# Patient Record
Sex: FEMALE | Race: WHITE | HISPANIC OR LATINO | Employment: FULL TIME | URBAN - METROPOLITAN AREA
[De-identification: names, ages, dates, MRNs, and addresses within clinical notes are randomized per-mention and may not be internally consistent; named-entity substitution may affect disease eponyms.]

---

## 2023-02-17 PROBLEM — G89.18 POST-OPERATIVE PAIN: Status: ACTIVE | Noted: 2023-02-17

## 2023-02-17 PROBLEM — J30.89 NON-SEASONAL ALLERGIC RHINITIS: Status: ACTIVE | Noted: 2023-02-17

## 2023-02-17 PROBLEM — H10.12 ALLERGIC CONJUNCTIVITIS OF LEFT EYE: Status: ACTIVE | Noted: 2023-02-17

## 2023-02-17 PROBLEM — Z98.61 CAD S/P PERCUTANEOUS CORONARY ANGIOPLASTY: Status: ACTIVE | Noted: 2023-02-17

## 2023-02-17 PROBLEM — E78.1 HYPERTRIGLYCERIDEMIA: Status: ACTIVE | Noted: 2023-02-17

## 2023-02-17 PROBLEM — R09.89 BRUIT OF LEFT CAROTID ARTERY: Status: ACTIVE | Noted: 2023-02-17

## 2023-02-17 PROBLEM — R06.83 SNORING: Status: ACTIVE | Noted: 2023-02-17

## 2023-02-17 PROBLEM — E66.3 OVERWEIGHT: Status: ACTIVE | Noted: 2023-02-17

## 2023-02-17 PROBLEM — E66.811 CLASS 1 OBESITY WITH BODY MASS INDEX (BMI) OF 34.0 TO 34.9 IN ADULT: Status: ACTIVE | Noted: 2023-02-17

## 2023-02-17 PROBLEM — M25.511 SHOULDER PAIN, RIGHT: Status: ACTIVE | Noted: 2023-02-17

## 2023-02-17 PROBLEM — M16.11 OSTEOARTHRITIS OF RIGHT HIP: Status: ACTIVE | Noted: 2023-02-17

## 2023-02-17 PROBLEM — F06.4 ANXIETY DISORDER DUE TO MEDICAL CONDITION: Status: ACTIVE | Noted: 2023-02-17

## 2023-02-17 PROBLEM — R07.9 CHEST PAIN: Status: ACTIVE | Noted: 2023-02-17

## 2023-02-17 PROBLEM — E78.5 DYSLIPIDEMIA: Status: ACTIVE | Noted: 2023-02-17

## 2023-02-17 PROBLEM — R00.2 PALPITATIONS: Status: ACTIVE | Noted: 2023-02-17

## 2023-02-17 PROBLEM — N39.0 UTI (URINARY TRACT INFECTION): Status: ACTIVE | Noted: 2023-02-17

## 2023-02-17 PROBLEM — J45.909 ASTHMA (HHS-HCC): Status: ACTIVE | Noted: 2023-02-17

## 2023-02-17 PROBLEM — E03.9 HYPOTHYROIDISM: Status: ACTIVE | Noted: 2023-02-17

## 2023-02-17 PROBLEM — R60.0 LOCALIZED EDEMA: Status: ACTIVE | Noted: 2023-02-17

## 2023-02-17 PROBLEM — I25.2 H/O NON-ST ELEVATION MYOCARDIAL INFARCTION (NSTEMI): Status: ACTIVE | Noted: 2023-02-17

## 2023-02-17 PROBLEM — R92.8 ABNORMAL MAMMOGRAM: Status: ACTIVE | Noted: 2023-02-17

## 2023-02-17 PROBLEM — I25.10 CAD S/P PERCUTANEOUS CORONARY ANGIOPLASTY: Status: ACTIVE | Noted: 2023-02-17

## 2023-02-17 PROBLEM — S46.911A STRAIN OF RIGHT SHOULDER: Status: ACTIVE | Noted: 2023-02-17

## 2023-02-17 PROBLEM — K59.04 CHRONIC IDIOPATHIC CONSTIPATION: Status: ACTIVE | Noted: 2023-02-17

## 2023-02-17 PROBLEM — J32.9 SINUS INFECTION: Status: ACTIVE | Noted: 2023-02-17

## 2023-02-17 PROBLEM — R00.1 SINUS BRADYCARDIA: Status: ACTIVE | Noted: 2023-02-17

## 2023-02-17 PROBLEM — R06.02 SHORTNESS OF BREATH: Status: ACTIVE | Noted: 2023-02-17

## 2023-02-17 PROBLEM — Z96.649 STATUS POST TOTAL REPLACEMENT OF HIP: Status: ACTIVE | Noted: 2023-02-17

## 2023-02-17 PROBLEM — E66.9 CLASS 1 OBESITY WITH BODY MASS INDEX (BMI) OF 34.0 TO 34.9 IN ADULT: Status: ACTIVE | Noted: 2023-02-17

## 2023-02-17 PROBLEM — F41.0 PANIC ATTACK: Status: ACTIVE | Noted: 2023-02-17

## 2023-02-17 PROBLEM — M25.9 HIP PROBLEM: Status: ACTIVE | Noted: 2023-02-17

## 2023-02-17 RX ORDER — ALBUTEROL SULFATE 90 UG/1
1-2 AEROSOL, METERED RESPIRATORY (INHALATION) EVERY 4 HOURS PRN
COMMUNITY
End: 2023-08-08 | Stop reason: SDUPTHER

## 2023-02-17 RX ORDER — ACETAMINOPHEN 500 MG
1 TABLET ORAL EVERY 6 HOURS PRN
COMMUNITY
Start: 2021-02-15

## 2023-02-17 RX ORDER — TRIAMCINOLONE ACETONIDE 55 UG/1
1 SPRAY, METERED NASAL
COMMUNITY
Start: 2020-02-28

## 2023-02-17 RX ORDER — BUSPIRONE HYDROCHLORIDE 5 MG/1
1 TABLET ORAL DAILY
COMMUNITY

## 2023-02-17 RX ORDER — CYCLOBENZAPRINE HCL 10 MG
1 TABLET ORAL NIGHTLY
COMMUNITY
Start: 2021-12-08

## 2023-02-17 RX ORDER — EPINEPHRINE 0.22MG
1 AEROSOL WITH ADAPTER (ML) INHALATION DAILY
COMMUNITY

## 2023-02-17 RX ORDER — ISOSORBIDE MONONITRATE 30 MG/1
1 TABLET, EXTENDED RELEASE ORAL DAILY
COMMUNITY

## 2023-02-17 RX ORDER — LEVOTHYROXINE SODIUM 75 UG/1
1 TABLET ORAL DAILY
COMMUNITY
Start: 2020-01-14 | End: 2023-04-06

## 2023-02-17 RX ORDER — NAPROXEN SODIUM 220 MG/1
1 TABLET, FILM COATED ORAL
COMMUNITY

## 2023-02-17 RX ORDER — DOCUSATE SODIUM 100 MG/1
1 CAPSULE, LIQUID FILLED ORAL DAILY
COMMUNITY
Start: 2021-11-05

## 2023-02-17 RX ORDER — ATORVASTATIN CALCIUM 20 MG/1
0.5 TABLET, FILM COATED ORAL NIGHTLY
COMMUNITY
Start: 2021-08-05 | End: 2023-11-10

## 2023-02-17 RX ORDER — MONTELUKAST SODIUM 10 MG/1
1 TABLET ORAL NIGHTLY
COMMUNITY
Start: 2020-01-13 | End: 2023-04-03

## 2023-02-17 RX ORDER — NITROGLYCERIN 0.4 MG/1
1 TABLET SUBLINGUAL EVERY 5 MIN PRN
COMMUNITY
Start: 2019-01-08 | End: 2023-12-11 | Stop reason: SDUPTHER

## 2023-02-17 RX ORDER — POLYETHYLENE GLYCOL 3350 17 G/17G
17 POWDER, FOR SOLUTION ORAL DAILY
COMMUNITY
Start: 2019-04-29

## 2023-03-31 ENCOUNTER — APPOINTMENT (OUTPATIENT)
Dept: PRIMARY CARE | Facility: CLINIC | Age: 58
End: 2023-03-31
Payer: COMMERCIAL

## 2023-04-03 DIAGNOSIS — J45.909 MILD ASTHMA WITHOUT COMPLICATION, UNSPECIFIED WHETHER PERSISTENT (HHS-HCC): ICD-10-CM

## 2023-04-03 RX ORDER — MONTELUKAST SODIUM 10 MG/1
TABLET ORAL
Qty: 90 TABLET | Refills: 3 | Status: SHIPPED | OUTPATIENT
Start: 2023-04-03

## 2023-04-04 DIAGNOSIS — E03.9 HYPOTHYROIDISM, UNSPECIFIED TYPE: ICD-10-CM

## 2023-04-06 DIAGNOSIS — E03.9 HYPOTHYROIDISM, UNSPECIFIED TYPE: Primary | ICD-10-CM

## 2023-04-06 RX ORDER — LEVOTHYROXINE SODIUM 75 UG/1
TABLET ORAL
Qty: 90 TABLET | Refills: 3 | Status: SHIPPED | OUTPATIENT
Start: 2023-04-06 | End: 2023-08-08 | Stop reason: SDUPTHER

## 2023-06-06 ENCOUNTER — OFFICE VISIT (OUTPATIENT)
Dept: PRIMARY CARE | Facility: CLINIC | Age: 58
End: 2023-06-06
Payer: COMMERCIAL

## 2023-06-06 VITALS
BODY MASS INDEX: 33.91 KG/M2 | SYSTOLIC BLOOD PRESSURE: 134 MMHG | WEIGHT: 167.9 LBS | HEART RATE: 70 BPM | DIASTOLIC BLOOD PRESSURE: 77 MMHG | OXYGEN SATURATION: 95 %

## 2023-06-06 DIAGNOSIS — E03.9 HYPOTHYROIDISM, UNSPECIFIED TYPE: ICD-10-CM

## 2023-06-06 DIAGNOSIS — I25.10 CAD S/P PERCUTANEOUS CORONARY ANGIOPLASTY: ICD-10-CM

## 2023-06-06 DIAGNOSIS — J20.9 ACUTE BRONCHITIS, UNSPECIFIED ORGANISM: ICD-10-CM

## 2023-06-06 DIAGNOSIS — J01.90 ACUTE SINUSITIS, RECURRENCE NOT SPECIFIED, UNSPECIFIED LOCATION: Primary | ICD-10-CM

## 2023-06-06 DIAGNOSIS — E55.9 VITAMIN D DEFICIENCY: ICD-10-CM

## 2023-06-06 DIAGNOSIS — Z79.899 DRUG THERAPY: ICD-10-CM

## 2023-06-06 DIAGNOSIS — E78.5 HYPERLIPIDEMIA, UNSPECIFIED HYPERLIPIDEMIA TYPE: ICD-10-CM

## 2023-06-06 DIAGNOSIS — J45.20 MILD INTERMITTENT ASTHMA, UNSPECIFIED WHETHER COMPLICATED (HHS-HCC): ICD-10-CM

## 2023-06-06 DIAGNOSIS — Z98.61 CAD S/P PERCUTANEOUS CORONARY ANGIOPLASTY: ICD-10-CM

## 2023-06-06 DIAGNOSIS — Z13.9 SCREENING FOR CONDITION: ICD-10-CM

## 2023-06-06 LAB
THYROTROPIN (MIU/L) IN SER/PLAS BY DETECTION LIMIT <= 0.05 MIU/L: 5.73 MIU/L (ref 0.44–3.98)
THYROXINE (T4) FREE (NG/DL) IN SER/PLAS: 0.75 NG/DL (ref 0.61–1.12)
TRIIODOTHYRONINE (T3) FREE (PG/ML) IN SER/PLAS: 3.1 PG/ML (ref 2.3–4.2)

## 2023-06-06 PROCEDURE — 84439 ASSAY OF FREE THYROXINE: CPT

## 2023-06-06 PROCEDURE — 99215 OFFICE O/P EST HI 40 MIN: CPT | Performed by: FAMILY MEDICINE

## 2023-06-06 PROCEDURE — 3008F BODY MASS INDEX DOCD: CPT | Performed by: FAMILY MEDICINE

## 2023-06-06 PROCEDURE — 84443 ASSAY THYROID STIM HORMONE: CPT

## 2023-06-06 PROCEDURE — 84481 FREE ASSAY (FT-3): CPT

## 2023-06-06 PROCEDURE — 99417 PROLNG OP E/M EACH 15 MIN: CPT | Performed by: FAMILY MEDICINE

## 2023-06-06 RX ORDER — DOXYCYCLINE 100 MG/1
CAPSULE ORAL
Qty: 20 CAPSULE | Refills: 0 | Status: SHIPPED | OUTPATIENT
Start: 2023-06-06 | End: 2023-10-31 | Stop reason: ALTCHOICE

## 2023-06-06 NOTE — PATIENT INSTRUCTIONS
We will do annual preventative visit with annual lab review next time.    Obesity, BMI 33, may have lost 6 pounds pounds the last year or so. -->>  Try to limit intake of simple carbohydrates, things like bread, pasta, potatoes, rice, sugars etc.    Regarding previous labs:  -Hyperlipidemia: HDL 57, goal is 45 or more.  LDL is 29, goal is 70 or less.  Total to good ratio is 2.0, goal is less than 2.8.  So cholesterol numbers are great on atorvastatin 20 mg. -->>  We will recheck with next annual labs.  -Hypothyroidism, on levothyroxine 75 mcg daily.  Last TSH in January was 3.16.  Patient notes she has been having cold intolerance and gaining weight for maybe a year or so. -->> will draw thyroid panel today.  If labs are consistent with last time, will and increase levothyroxine to 100 mcg when the labs get back.  Also discussed other options.  -Vitamin D deficiency.  Patient's not on vitamin D at this time.  No value found on the chart. -->>  We will check with labs in a couple months.  In the meantime I recommend you  5000 units vitamin D3, take 1 daily.  -History of anemia post hip surgery.  Otherwise hemoglobin was within normal limits. -->>  Will be checking annual labs before next appointment.    asthma.  Needing albuterol maybe twice a week.    -We will draw thyroid panel today.  Call us in 2 days if you do not hear any results.  - Return in about 2 months for annual lab review and preventative visit.  - We will give patient lab slip to get fasting annual labs including a thyroid panel at the [facility] about a week before next appointment.

## 2023-06-06 NOTE — PROGRESS NOTES
Subjective   Patient ID: Scotty Vazquez is a 58 y.o. female who presents for NEW PATIENT SICK VISIT (Possible sinus x1week. Congestion, headache and tightness in chest.).  HPI  Acute sinusitis, with acute bronchitis.  Started about a week ago.  Has been using Mucinex, Nasacort, Zyrtec. -->>  We will prescribe 10 days of doxycycline.  Continue the Mucinex, you might want to get the 12-hour version, you could take 1200 mg every 12 hours with a big glass of water and plenty of water throughout the day to help the phlegm cough up more easily.  Continue your Nasacort.  You mind considering skipping Zyrtec sometimes.  Because it can make your phlegm more thick which is kind of the opposite of what the Mucinex.    Review of Systems  Denies N/V/D/C, denies rashes/lesions, no CP/SOB. Denies fevers, for chills.  Positive for dizziness and frontal headache.  All other systems were negative.    Objective   Physical Exam  Gen: NAD  eyes: EOMI, PERRLA  ENT: hearing grossly intact, no nasal discharge  resp: CTABL, without R/R  heart: RRR without MRG  GI: abd: S/ND/NT, BS+  lymph: no axillary, cervical, supraclavicular lymphadenopathy noted   MS: gait grossly WNL,  derm: no rashes or lesions noted  neuro: CN II-XII grossly intact  psych: A&Ox3    Assessment/Plan   Diagnoses and all orders for this visit:  Acute sinusitis, recurrence not specified, unspecified location  -     doxycycline (Monodox) 100 mg capsule; 1 by mouth twice daily with food for 10 days.  Acute bronchitis, unspecified organism  -     doxycycline (Monodox) 100 mg capsule; 1 by mouth twice daily with food for 10 days.  Hypothyroidism, unspecified type  Mild intermittent asthma, unspecified whether complicated  CAD S/P percutaneous coronary angioplasty  BMI 33.0-33.9,adult  Hyperlipidemia, unspecified hyperlipidemia type           We will do annual preventative visit with annual lab review next time.    Obesity, BMI 33, may have lost 6 pounds pounds the last year  or so. -->>  Try to limit intake of simple carbohydrates, things like bread, pasta, potatoes, rice, sugars etc.    Regarding previous labs:  -Hyperlipidemia: HDL 57, goal is 45 or more.  LDL is 29, goal is 70 or less.  Total to good ratio is 2.0, goal is less than 2.8.  So cholesterol numbers are great on atorvastatin 20 mg. -->>  We will recheck with next annual labs.  -Hypothyroidism, on levothyroxine 75 mcg daily.  Last TSH in January was 3.16.  Patient notes she has been having cold intolerance and gaining weight for maybe a year or so. -->> will draw thyroid panel today.  If labs are consistent with last time, will and increase levothyroxine to 100 mcg when the labs get back.  Also discussed other options.  -Vitamin D deficiency.  Patient's not on vitamin D at this time.  No value found on the chart. -->>  We will check with labs in a couple months.  In the meantime I recommend you  5000 units vitamin D3, take 1 daily.  -History of anemia post hip surgery.  Otherwise hemoglobin was within normal limits. -->>  Will be checking annual labs before next appointment.    asthma.  Needing albuterol maybe twice a week.    -We will draw thyroid panel today.  Call us in 2 days if you do not hear any results.  - Return in about 2 months for annual lab review and preventative visit.  - We will give patient lab slip to get fasting annual labs including a thyroid panel at the [facility] about a week before next appointment.

## 2023-06-19 ENCOUNTER — APPOINTMENT (OUTPATIENT)
Dept: PRIMARY CARE | Facility: CLINIC | Age: 58
End: 2023-06-19
Payer: COMMERCIAL

## 2023-08-01 ENCOUNTER — LAB (OUTPATIENT)
Dept: LAB | Facility: LAB | Age: 58
End: 2023-08-01
Payer: COMMERCIAL

## 2023-08-01 DIAGNOSIS — E55.9 VITAMIN D DEFICIENCY: ICD-10-CM

## 2023-08-01 DIAGNOSIS — E03.9 HYPOTHYROIDISM, UNSPECIFIED TYPE: ICD-10-CM

## 2023-08-01 DIAGNOSIS — Z13.9 SCREENING FOR CONDITION: ICD-10-CM

## 2023-08-01 DIAGNOSIS — Z79.899 DRUG THERAPY: ICD-10-CM

## 2023-08-01 LAB
ALANINE AMINOTRANSFERASE (SGPT) (U/L) IN SER/PLAS: 22 U/L (ref 7–45)
ALBUMIN (G/DL) IN SER/PLAS: 4.3 G/DL (ref 3.4–5)
ALKALINE PHOSPHATASE (U/L) IN SER/PLAS: 77 U/L (ref 33–110)
ANION GAP IN SER/PLAS: 11 MMOL/L (ref 10–20)
APPEARANCE, URINE: ABNORMAL
ASPARTATE AMINOTRANSFERASE (SGOT) (U/L) IN SER/PLAS: 18 U/L (ref 9–39)
BACTERIA, URINE: ABNORMAL /HPF
BASOPHILS (10*3/UL) IN BLOOD BY AUTOMATED COUNT: 0.02 X10E9/L (ref 0–0.1)
BASOPHILS/100 LEUKOCYTES IN BLOOD BY AUTOMATED COUNT: 0.3 % (ref 0–2)
BILIRUBIN TOTAL (MG/DL) IN SER/PLAS: 0.6 MG/DL (ref 0–1.2)
BILIRUBIN, URINE: NEGATIVE
BLOOD, URINE: NEGATIVE
CALCIUM (MG/DL) IN SER/PLAS: 9.2 MG/DL (ref 8.6–10.3)
CARBON DIOXIDE, TOTAL (MMOL/L) IN SER/PLAS: 28 MMOL/L (ref 21–32)
CHLORIDE (MMOL/L) IN SER/PLAS: 107 MMOL/L (ref 98–107)
CHOLESTEROL (MG/DL) IN SER/PLAS: 123 MG/DL (ref 0–199)
CHOLESTEROL IN HDL (MG/DL) IN SER/PLAS: 50.7 MG/DL
CHOLESTEROL/HDL RATIO: 2.4
COLOR, URINE: YELLOW
CREATININE (MG/DL) IN SER/PLAS: 0.63 MG/DL (ref 0.5–1.05)
EOSINOPHILS (10*3/UL) IN BLOOD BY AUTOMATED COUNT: 0.2 X10E9/L (ref 0–0.7)
EOSINOPHILS/100 LEUKOCYTES IN BLOOD BY AUTOMATED COUNT: 3.1 % (ref 0–6)
ERYTHROCYTE DISTRIBUTION WIDTH (RATIO) BY AUTOMATED COUNT: 13.4 % (ref 11.5–14.5)
ERYTHROCYTE MEAN CORPUSCULAR HEMOGLOBIN CONCENTRATION (G/DL) BY AUTOMATED: 33.6 G/DL (ref 32–36)
ERYTHROCYTE MEAN CORPUSCULAR VOLUME (FL) BY AUTOMATED COUNT: 91 FL (ref 80–100)
ERYTHROCYTES (10*6/UL) IN BLOOD BY AUTOMATED COUNT: 4.59 X10E12/L (ref 4–5.2)
ESTIMATED AVERAGE GLUCOSE FOR HBA1C: 108 MG/DL
GFR FEMALE: >90 ML/MIN/1.73M2
GLUCOSE (MG/DL) IN SER/PLAS: 95 MG/DL (ref 74–99)
GLUCOSE, URINE: NEGATIVE MG/DL
HEMATOCRIT (%) IN BLOOD BY AUTOMATED COUNT: 41.7 % (ref 36–46)
HEMOGLOBIN (G/DL) IN BLOOD: 14 G/DL (ref 12–16)
HEMOGLOBIN A1C/HEMOGLOBIN TOTAL IN BLOOD: 5.4 %
IMMATURE GRANULOCYTES/100 LEUKOCYTES IN BLOOD BY AUTOMATED COUNT: 0.3 % (ref 0–0.9)
KETONES, URINE: NEGATIVE MG/DL
LDL: 48 MG/DL (ref 0–99)
LEUKOCYTE ESTERASE, URINE: ABNORMAL
LEUKOCYTES (10*3/UL) IN BLOOD BY AUTOMATED COUNT: 6.5 X10E9/L (ref 4.4–11.3)
LYMPHOCYTES (10*3/UL) IN BLOOD BY AUTOMATED COUNT: 2.31 X10E9/L (ref 1.2–4.8)
LYMPHOCYTES/100 LEUKOCYTES IN BLOOD BY AUTOMATED COUNT: 35.4 % (ref 13–44)
MAGNESIUM (MG/DL) IN SER/PLAS: 2.18 MG/DL (ref 1.6–2.4)
MONOCYTES (10*3/UL) IN BLOOD BY AUTOMATED COUNT: 0.39 X10E9/L (ref 0.1–1)
MONOCYTES/100 LEUKOCYTES IN BLOOD BY AUTOMATED COUNT: 6 % (ref 2–10)
MUCUS, URINE: ABNORMAL /LPF
NEUTROPHILS (10*3/UL) IN BLOOD BY AUTOMATED COUNT: 3.58 X10E9/L (ref 1.2–7.7)
NEUTROPHILS/100 LEUKOCYTES IN BLOOD BY AUTOMATED COUNT: 54.9 % (ref 40–80)
NITRITE, URINE: NEGATIVE
PH, URINE: 5 (ref 5–8)
PLATELETS (10*3/UL) IN BLOOD AUTOMATED COUNT: 234 X10E9/L (ref 150–450)
POTASSIUM (MMOL/L) IN SER/PLAS: 4.3 MMOL/L (ref 3.5–5.3)
PROTEIN TOTAL: 6.7 G/DL (ref 6.4–8.2)
PROTEIN, URINE: NEGATIVE MG/DL
RBC, URINE: 1 /HPF (ref 0–5)
SODIUM (MMOL/L) IN SER/PLAS: 142 MMOL/L (ref 136–145)
SPECIFIC GRAVITY, URINE: 1.02 (ref 1–1.03)
SQUAMOUS EPITHELIAL CELLS, URINE: 19 /HPF
THYROTROPIN (MIU/L) IN SER/PLAS BY DETECTION LIMIT <= 0.05 MIU/L: 7.02 MIU/L (ref 0.44–3.98)
THYROXINE (T4) FREE (NG/DL) IN SER/PLAS: 0.75 NG/DL (ref 0.61–1.12)
TRIGLYCERIDE (MG/DL) IN SER/PLAS: 120 MG/DL (ref 0–149)
TRIIODOTHYRONINE (T3) FREE (PG/ML) IN SER/PLAS: 3.1 PG/ML (ref 2.3–4.2)
UREA NITROGEN (MG/DL) IN SER/PLAS: 16 MG/DL (ref 6–23)
UROBILINOGEN, URINE: <2 MG/DL (ref 0–1.9)
VLDL: 24 MG/DL (ref 0–40)
WBC, URINE: 63 /HPF (ref 0–5)

## 2023-08-01 PROCEDURE — 84481 FREE ASSAY (FT-3): CPT

## 2023-08-01 PROCEDURE — 36415 COLL VENOUS BLD VENIPUNCTURE: CPT

## 2023-08-01 PROCEDURE — 84439 ASSAY OF FREE THYROXINE: CPT

## 2023-08-01 PROCEDURE — 85025 COMPLETE CBC W/AUTO DIFF WBC: CPT

## 2023-08-01 PROCEDURE — 81001 URINALYSIS AUTO W/SCOPE: CPT

## 2023-08-01 PROCEDURE — 83036 HEMOGLOBIN GLYCOSYLATED A1C: CPT

## 2023-08-01 PROCEDURE — 84443 ASSAY THYROID STIM HORMONE: CPT

## 2023-08-01 PROCEDURE — 80053 COMPREHEN METABOLIC PANEL: CPT

## 2023-08-01 PROCEDURE — 83735 ASSAY OF MAGNESIUM: CPT

## 2023-08-01 PROCEDURE — 80061 LIPID PANEL: CPT

## 2023-08-01 PROCEDURE — 82652 VIT D 1 25-DIHYDROXY: CPT

## 2023-08-03 LAB — VITAMIN D 1,25-DIHYDROXY: 41.7 PG/ML (ref 19.9–79.3)

## 2023-08-08 ENCOUNTER — OFFICE VISIT (OUTPATIENT)
Dept: PRIMARY CARE | Facility: CLINIC | Age: 58
End: 2023-08-08
Payer: COMMERCIAL

## 2023-08-08 ENCOUNTER — APPOINTMENT (OUTPATIENT)
Dept: PRIMARY CARE | Facility: CLINIC | Age: 58
End: 2023-08-08
Payer: COMMERCIAL

## 2023-08-08 VITALS
WEIGHT: 169 LBS | HEIGHT: 59 IN | SYSTOLIC BLOOD PRESSURE: 133 MMHG | HEART RATE: 71 BPM | OXYGEN SATURATION: 97 % | DIASTOLIC BLOOD PRESSURE: 77 MMHG | BODY MASS INDEX: 34.07 KG/M2

## 2023-08-08 DIAGNOSIS — Z11.59 NEED FOR HEPATITIS C SCREENING TEST: ICD-10-CM

## 2023-08-08 DIAGNOSIS — N39.0 URINARY TRACT INFECTION WITHOUT HEMATURIA, SITE UNSPECIFIED: ICD-10-CM

## 2023-08-08 DIAGNOSIS — E55.9 VITAMIN D DEFICIENCY: Primary | ICD-10-CM

## 2023-08-08 DIAGNOSIS — J45.909 MILD ASTHMA, UNSPECIFIED WHETHER COMPLICATED, UNSPECIFIED WHETHER PERSISTENT (HHS-HCC): ICD-10-CM

## 2023-08-08 DIAGNOSIS — E03.9 HYPOTHYROIDISM, UNSPECIFIED TYPE: ICD-10-CM

## 2023-08-08 PROCEDURE — 1036F TOBACCO NON-USER: CPT | Performed by: FAMILY MEDICINE

## 2023-08-08 PROCEDURE — 3008F BODY MASS INDEX DOCD: CPT | Performed by: FAMILY MEDICINE

## 2023-08-08 PROCEDURE — 99214 OFFICE O/P EST MOD 30 MIN: CPT | Performed by: FAMILY MEDICINE

## 2023-08-08 PROCEDURE — 99396 PREV VISIT EST AGE 40-64: CPT | Performed by: FAMILY MEDICINE

## 2023-08-08 RX ORDER — LEVOTHYROXINE SODIUM 100 UG/1
TABLET ORAL
Qty: 90 TABLET | Refills: 1 | Status: SHIPPED | OUTPATIENT
Start: 2023-08-08 | End: 2023-10-31 | Stop reason: SDUPTHER

## 2023-08-08 RX ORDER — ALBUTEROL SULFATE 90 UG/1
1-2 AEROSOL, METERED RESPIRATORY (INHALATION) EVERY 4 HOURS PRN
Qty: 18 G | Refills: 5 | Status: SHIPPED | OUTPATIENT
Start: 2023-08-08

## 2023-08-08 RX ORDER — NITROFURANTOIN 25; 75 MG/1; MG/1
100 CAPSULE ORAL 2 TIMES DAILY
Qty: 10 CAPSULE | Refills: 0 | Status: SHIPPED | OUTPATIENT
Start: 2023-08-08 | End: 2023-08-13

## 2023-08-08 NOTE — PROGRESS NOTES
Subjective   Patient ID: Scotty Vazquez is a 58 y.o. female who presents for 2Month FU (Pt in today for routine 2 month FU).    Review of Systems  Denies N/V/D/C, no HA/S/V, denies rashes/lesions, no CP/SOB. Denies fevers/chills.  All other systems were negative.    Objective   Physical Exam  Gen: NAD  eyes: EOMI, PERRLA  ENT: hearing grossly intact, no nasal discharge  resp: CTABL, without R/R  heart: RRR without MRG  GI: abd: S/ND/NT, BS+  lymph: no axillary, cervical, supraclavicular lymphadenopathy noted   MS: gait grossly WNL,  derm: no rashes or lesions noted  neuro: CN II-XII grossly intact  psych: A&Ox3    Assessment/Plan   Diagnoses and all orders for this visit:  Vitamin D deficiency  -     Vitamin D 25-Hydroxy,Total; Future  Hypothyroidism, unspecified type  -     levothyroxine (Synthroid, Levoxyl) 100 mcg tablet; 1 by mouth daily first thing in the morning with half a glass of water on an empty stomach 30 minutes before any food.  -     Thyroxine, Free; Future  -     Triiodothyronine, Free; Future  -     Thyroid Stimulating Hormone; Future  Need for hepatitis C screening test  -     Hepatitis C antibody; Future  Urinary tract infection without hematuria, site unspecified  -     nitrofurantoin, macrocrystal-monohydrate, (Macrobid) 100 mg capsule; Take 1 capsule (100 mg) by mouth 2 times a day for 5 days.  Mild asthma, unspecified whether complicated, unspecified whether persistent  -     albuterol (ProAir HFA) 90 mcg/actuation inhaler; Inhale 1-2 puffs every 4 hours if needed for wheezing or shortness of breath.           We will do annual preventative visit today with annual lab review.    -----  We will screen for hepatitis C net time do blood work.  Screening for breast cance: Gets mammograms every other year.  Will be due around September 2023.   Screening for cervical cancer, will plan to do Pap at next appointment.    Will be due for flu shot in the next couple months.  Up-to-date on coronavirus  including by valent booster.  Most recent tetanus around 2018.  Has had part one of the Shingrix series  -->> Check with your pharmacy to keep up-to-date on immunizations.  -----      Obesity, BMI 33, may have lost 6 pounds pounds the last year or so. -->>  Try to limit intake of simple carbohydrates, things like bread, pasta, potatoes, rice, sugars etc.    New annual labs reviewed: Next annual labs around August 2024.  Before next appointment thyroid panel, vitamin D.  -Drug therapy, screening for conditions.  A1c is 5.4, so no diabetes.  -Hyperlipidemia: HDL 51, was 57, goal is 45 or more.  LDL is 48, was 29, goal is 70 or less.  Total to good ratio is 2.4, was 2.0, goal is less than 2.8.  So cholesterol numbers are great on atorvastatin 20 mg. -->>  We will recheck with next annual labs.  -Hypothyroidism, TSH 7.02, was 5.73, 3.16, on levothyroxine 75 mcg daily.  FT4 25% and normal, FT3 mid normal.  Patient notes she has been having cold intolerance and gaining weight for maybe a year or so. -->>  We will increase levothyroxine to 100 mcg daily.  We will recheck in 2 to 3 months.  -Vitamin D deficiency.  42, for the 1, 25 D [place] version.  Patient's not on vitamin D at this time.  -->>  Recommend starting 1 to 2000 units daily vitamin D3.  He.  We will recheck in 3 months.    -History of anemia post hip surgery.  Otherwise hemoglobin was within normal limits. -->>  Will be checking annual labs before next appointment.  -Dehydrated. -->>  Drink more water.  A good goal would be 3 to 4 quarts a day.  -Acute UTI.  Patient does note dysuria also. -->>  We will prescribe nitrofurantoin      asthma.  Needing albuterol maybe twice a week. -->>  Will refill as needed.      - return the last week of October for Pap and lab review.  - patient will go to [facility] a week before the appointment to get labs that were ordered.

## 2023-08-08 NOTE — PATIENT INSTRUCTIONS
We will do annual preventative visit today with annual lab review.    -----  We will screen for hepatitis C net time do blood work.  Screening for breast cance: Gets mammograms every other year.  Will be due around September 2023.   Screening for cervical cancer, will plan to do Pap at next appointment.    Will be due for flu shot in the next couple months.  Up-to-date on coronavirus including by valent booster.  Most recent tetanus around 2018.  Has had part one of the Shingrix series  -->> Check with your pharmacy to keep up-to-date on immunizations.  -----      Obesity, BMI 33, may have lost 6 pounds pounds the last year or so. -->>  Try to limit intake of simple carbohydrates, things like bread, pasta, potatoes, rice, sugars etc.    New annual labs reviewed: Next annual labs around August 2024.  Before next appointment thyroid panel, vitamin D.  -Drug therapy, screening for conditions.  A1c is 5.4, so no diabetes.  -Hyperlipidemia: HDL 51, was 57, goal is 45 or more.  LDL is 48, was 29, goal is 70 or less.  Total to good ratio is 2.4, was 2.0, goal is less than 2.8.  So cholesterol numbers are great on atorvastatin 20 mg. -->>  We will recheck with next annual labs.  -Hypothyroidism, TSH 7.02, was 5.73, 3.16, on levothyroxine 75 mcg daily.  FT4 25% and normal, FT3 mid normal.  Patient notes she has been having cold intolerance and gaining weight for maybe a year or so. -->>  We will increase levothyroxine to 100 mcg daily.  We will recheck in 2 to 3 months.  -Vitamin D deficiency.  42, for the 1, 25 D OH version.  Patient's not on vitamin D at this time.  -->>  Recommend starting 1 to 2000 units daily vitamin D3.  He.  We will recheck in 3 months.    -History of anemia post hip surgery.  Otherwise hemoglobin was within normal limits. -->>  Will be checking annual labs before next appointment.  -Dehydrated. -->>  Drink more water.  A good goal would be 3 to 4 quarts a day.  -Acute UTI.  Patient does note  dysuria also. -->>  We will prescribe nitrofurantoin      asthma.  Needing albuterol maybe twice a week. -->>  Will refill as needed.      - return the last week of October for Pap and lab review.  - patient will go to [facility] a week before the appointment to get labs that were ordered.

## 2023-08-14 ENCOUNTER — TELEPHONE (OUTPATIENT)
Dept: PRIMARY CARE | Facility: CLINIC | Age: 58
End: 2023-08-14
Payer: COMMERCIAL

## 2023-08-14 DIAGNOSIS — N30.00 ACUTE CYSTITIS WITHOUT HEMATURIA: Primary | ICD-10-CM

## 2023-08-14 NOTE — TELEPHONE ENCOUNTER
Have her come in I ordered another urinalysis with culture and sensitivity.  Have her drop off the pH and we will try to grow bacteria to see what a proper antibiotic would be.  Thanks.

## 2023-08-15 ENCOUNTER — APPOINTMENT (OUTPATIENT)
Dept: PRIMARY CARE | Facility: CLINIC | Age: 58
End: 2023-08-15
Payer: COMMERCIAL

## 2023-08-15 ENCOUNTER — TELEPHONE (OUTPATIENT)
Dept: PRIMARY CARE | Facility: CLINIC | Age: 58
End: 2023-08-15

## 2023-08-15 DIAGNOSIS — N30.00 ACUTE CYSTITIS WITHOUT HEMATURIA: Primary | ICD-10-CM

## 2023-08-17 ENCOUNTER — LAB (OUTPATIENT)
Dept: LAB | Facility: LAB | Age: 58
End: 2023-08-17
Payer: COMMERCIAL

## 2023-08-17 DIAGNOSIS — N30.00 ACUTE CYSTITIS WITHOUT HEMATURIA: ICD-10-CM

## 2023-08-17 PROCEDURE — 87086 URINE CULTURE/COLONY COUNT: CPT

## 2023-08-17 NOTE — TELEPHONE ENCOUNTER
Sorry, no secondary antibiotics unless we get a culture so we can at least get an idea what were trying to treat.  Thank you

## 2023-08-18 LAB — URINE CULTURE: NORMAL

## 2023-08-21 DIAGNOSIS — N39.0 URINARY TRACT INFECTION WITHOUT HEMATURIA, SITE UNSPECIFIED: Primary | ICD-10-CM

## 2023-10-25 ENCOUNTER — LAB (OUTPATIENT)
Dept: LAB | Facility: LAB | Age: 58
End: 2023-10-25
Payer: COMMERCIAL

## 2023-10-25 DIAGNOSIS — Z11.59 NEED FOR HEPATITIS C SCREENING TEST: ICD-10-CM

## 2023-10-25 DIAGNOSIS — E03.9 HYPOTHYROIDISM, UNSPECIFIED TYPE: ICD-10-CM

## 2023-10-25 DIAGNOSIS — N39.0 URINARY TRACT INFECTION WITHOUT HEMATURIA, SITE UNSPECIFIED: ICD-10-CM

## 2023-10-25 DIAGNOSIS — E55.9 VITAMIN D DEFICIENCY: ICD-10-CM

## 2023-10-25 LAB
25(OH)D3 SERPL-MCNC: 30 NG/ML (ref 30–100)
APPEARANCE UR: ABNORMAL
BACTERIA #/AREA URNS AUTO: ABNORMAL /HPF
BILIRUB UR STRIP.AUTO-MCNC: NEGATIVE MG/DL
COLOR UR: ABNORMAL
GLUCOSE UR STRIP.AUTO-MCNC: NEGATIVE MG/DL
KETONES UR STRIP.AUTO-MCNC: ABNORMAL MG/DL
LEUKOCYTE ESTERASE UR QL STRIP.AUTO: ABNORMAL
MUCOUS THREADS #/AREA URNS AUTO: ABNORMAL /LPF
NITRITE UR QL STRIP.AUTO: NEGATIVE
PH UR STRIP.AUTO: 5 [PH]
PROT UR STRIP.AUTO-MCNC: ABNORMAL MG/DL
RBC # UR STRIP.AUTO: NEGATIVE /UL
RBC #/AREA URNS AUTO: ABNORMAL /HPF
SP GR UR STRIP.AUTO: 1.02
SQUAMOUS #/AREA URNS AUTO: ABNORMAL /HPF
T4 FREE SERPL-MCNC: 1.12 NG/DL (ref 0.61–1.12)
TSH SERPL-ACNC: 0.4 MIU/L (ref 0.44–3.98)
UROBILINOGEN UR STRIP.AUTO-MCNC: 2 MG/DL
WBC #/AREA URNS AUTO: >50 /HPF
WBC CLUMPS #/AREA URNS AUTO: ABNORMAL /HPF

## 2023-10-25 PROCEDURE — 84439 ASSAY OF FREE THYROXINE: CPT

## 2023-10-25 PROCEDURE — 82306 VITAMIN D 25 HYDROXY: CPT

## 2023-10-25 PROCEDURE — 36415 COLL VENOUS BLD VENIPUNCTURE: CPT

## 2023-10-25 PROCEDURE — 86803 HEPATITIS C AB TEST: CPT

## 2023-10-25 PROCEDURE — 84481 FREE ASSAY (FT-3): CPT

## 2023-10-25 PROCEDURE — 84443 ASSAY THYROID STIM HORMONE: CPT

## 2023-10-25 PROCEDURE — 81001 URINALYSIS AUTO W/SCOPE: CPT

## 2023-10-26 LAB
HCV AB SER QL: NONREACTIVE
T3FREE SERPL-MCNC: 4.1 PG/ML (ref 2.3–4.2)

## 2023-10-31 ENCOUNTER — OFFICE VISIT (OUTPATIENT)
Dept: PRIMARY CARE | Facility: CLINIC | Age: 58
End: 2023-10-31
Payer: COMMERCIAL

## 2023-10-31 VITALS
OXYGEN SATURATION: 95 % | DIASTOLIC BLOOD PRESSURE: 76 MMHG | WEIGHT: 168 LBS | HEIGHT: 59 IN | BODY MASS INDEX: 33.87 KG/M2 | SYSTOLIC BLOOD PRESSURE: 128 MMHG | HEART RATE: 66 BPM

## 2023-10-31 DIAGNOSIS — E55.9 VITAMIN D DEFICIENCY: ICD-10-CM

## 2023-10-31 DIAGNOSIS — N30.00 ACUTE CYSTITIS WITHOUT HEMATURIA: ICD-10-CM

## 2023-10-31 DIAGNOSIS — E03.9 HYPOTHYROIDISM, UNSPECIFIED TYPE: Primary | ICD-10-CM

## 2023-10-31 DIAGNOSIS — E78.5 DYSLIPIDEMIA: ICD-10-CM

## 2023-10-31 DIAGNOSIS — Z11.59 NEED FOR HEPATITIS C SCREENING TEST: ICD-10-CM

## 2023-10-31 DIAGNOSIS — N30.01 ACUTE CYSTITIS WITH HEMATURIA: ICD-10-CM

## 2023-10-31 DIAGNOSIS — Z12.4 CERVICAL CANCER SCREENING: ICD-10-CM

## 2023-10-31 PROCEDURE — 87624 HPV HI-RISK TYP POOLED RSLT: CPT

## 2023-10-31 PROCEDURE — 3008F BODY MASS INDEX DOCD: CPT | Performed by: FAMILY MEDICINE

## 2023-10-31 PROCEDURE — 1036F TOBACCO NON-USER: CPT | Performed by: FAMILY MEDICINE

## 2023-10-31 PROCEDURE — 99214 OFFICE O/P EST MOD 30 MIN: CPT | Performed by: FAMILY MEDICINE

## 2023-10-31 PROCEDURE — 88141 CYTOPATH C/V INTERPRET: CPT | Performed by: STUDENT IN AN ORGANIZED HEALTH CARE EDUCATION/TRAINING PROGRAM

## 2023-10-31 PROCEDURE — 88175 CYTOPATH C/V AUTO FLUID REDO: CPT

## 2023-10-31 RX ORDER — LEVOTHYROXINE SODIUM 100 UG/1
TABLET ORAL
Qty: 90 TABLET | Refills: 1 | Status: SHIPPED | OUTPATIENT
Start: 2023-10-31 | End: 2023-11-07 | Stop reason: SDUPTHER

## 2023-10-31 RX ORDER — NITROFURANTOIN 25; 75 MG/1; MG/1
100 CAPSULE ORAL 2 TIMES DAILY
Qty: 10 CAPSULE | Refills: 0 | Status: SHIPPED | OUTPATIENT
Start: 2023-10-31 | End: 2023-11-05

## 2023-10-31 NOTE — PROGRESS NOTES
Subjective   Patient ID: Scotty Vazquez is a 58 y.o. female who presents for Lab Review FU (Pt in today for routine lab review FU / Pap Test).    Review of Systems  Denies N/V/D/C, no HA/S/V, denies rashes/lesions, no CP/SOB. Denies fevers/chills.  All other systems were negative.    Objective   Physical Exam  Gen: NAD  eyes: EOMI, PERRLA  ENT: hearing grossly intact, no nasal discharge  resp: CTABL, without R/R  heart: RRR without MRG  GI: abd: S/ND/NT, BS+  lymph: no axillary, cervical, supraclavicular lymphadenopathy noted   Breast: no masses, discharge, or orther abnormality noted.  : external genitalia without masses or tenderness. No vaginal discharge. No cervical tenderness.  Pap collected.  MS: gait grossly WNL,  derm: no rashes or lesions noted  neuro: CN II-XII grossly intact  psych: A&Ox3    Assessment/Plan   Diagnoses and all orders for this visit:  Hypothyroidism, unspecified type  -     Thyroxine, Free; Future  -     Triiodothyronine, Free; Future  -     Thyroid Stimulating Hormone; Future  -     levothyroxine (Synthroid, Levoxyl) 100 mcg tablet; 1 by mouth daily first thing in the morning with half a glass of water on an empty stomach 30 minutes before any food.  Acute cystitis with hematuria  -     nitrofurantoin, macrocrystal-monohydrate, (Macrobid) 100 mg capsule; Take 1 capsule (100 mg) by mouth 2 times a day for 5 days. Take with food  -     Urinalysis with Reflex Microscopic and Culture; Future  -     Urinalysis with Reflex Microscopic and Culture; Future  Acute cystitis without hematuria  Vitamin D deficiency  -     Vitamin D 25-Hydroxy,Total (for eval of Vitamin D levels); Future  Need for hepatitis C screening test  BMI 33.0-33.9,adult  Dyslipidemia           2024 will be next annual preventative visit.    -----  screen for hepatitis C nonreactive.    Screening for breast cance: Gets mammograms every other year.  Will be due around September 2023.   Screening for cervical cancer, will plan  to do Pap at next appointment.    Had annual flu shot and also on pneumonia shot around September 2023.    Up-to-date on coronavirus including by valent booster.  Most recent tetanus around 2018.  Has had part one of the Shingrix series  -->> Check with your pharmacy to keep up-to-date on immunizations.  -----    Greening for cervical cancer.  Doing Pap today.  Patient denies any concerns or problems.    Obesity, BMI 33, down 1 pound since last appt here almost 3 months ago. -->>  Continue to limit intake of simple carbohydrates, things like bread, pasta, potatoes, rice, sugars etc.    New labs reviewed: Next annual labs around August 2024.    -Acute UTI, patient denies any symptoms.  Has over 50 WBC per hpf. -->> will rx nitrofurantoin. Recheck UA about a month.  - -Hypothyroidism, TSH 0.4, was 7.02, 5.73, 3.16, on levothyroxine 100 mcg daily.  FT4 100% of normal, FT3 95% of normal.  Patient notes cold intolerance has resolved.  Feels a little more energy.  Denies being too cold or too hot.  -->>  Continue 100 mcg levothyroxine.  We will recheck in 6 to 12 months.  -Vitamin D deficiency.  30, is on vitamin D, 1000 daily, only taking it once in a while. -->>  Recommend increasing to 5000 units daily of vitamin D3.  We will recheck in about 3 to 6 months.    Regarding previous labs: Annual labs due around August.  -Drug therapy, screening for conditions.  A1c is 5.4, so no diabetes.  -Hyperlipidemia: HDL 51, was 57, goal is 45 or more.  LDL is 48, was 29, goal is 70 or less.  Total to good ratio is 2.4, was 2.0, goal is less than 2.8.  So cholesterol numbers are great on atorvastatin 20 mg. -->>  We will recheck with next annual labs.  History of anemia post hip surgery.  Otherwise hemoglobin was within normal limits. -->>  Will be checking annual labs before next appointment.  -Dehydrated. -->>  Drink more water.  A good goal would be 3 to 4 quarts a day.  -Acute UTI.  Patient does note dysuria also. -->>  We will  prescribe nitrofurantoin      asthma.  Needing albuterol maybe twice a week. -->>  Will refill as needed.      - Remember to go to the [facility] in about a month to recheck your urine.  Call us 10 to 14 days after if you do not hear any results.  -Return here in about 5 months for annual preventative visit and lab review.  -Go to the [facility] about a week before that appointment to get labs which will include vitamin D, thyroid panel, and urinalysis.

## 2023-10-31 NOTE — PATIENT INSTRUCTIONS
2024 will be next annual preventative visit.    -----  screen for hepatitis C nonreactive.    Screening for breast cance: Gets mammograms every other year.  Will be due around September 2023.   Screening for cervical cancer, will plan to do Pap at next appointment.    Had annual flu shot and also on pneumonia shot around September 2023.    Up-to-date on coronavirus including by valent booster.  Most recent tetanus around 2018.  Has had part one of the Shingrix series  -->> Check with your pharmacy to keep up-to-date on immunizations.  -----    Greening for cervical cancer.  Doing Pap today.  Patient denies any concerns or problems.    Obesity, BMI 33, down 1 pound since last appt here almost 3 months ago. -->>  Continue to limit intake of simple carbohydrates, things like bread, pasta, potatoes, rice, sugars etc.    New labs reviewed: Next annual labs around August 2024.    -Acute UTI, patient denies any symptoms.  Has over 50 WBC per hpf. -->> will rx nitrofurantoin. Recheck UA about a month.  - -Hypothyroidism, TSH 0.4, was 7.02, 5.73, 3.16, on levothyroxine 100 mcg daily.  FT4 100% of normal, FT3 95% of normal.  Patient notes cold intolerance has resolved.  Feels a little more energy.  Denies being too cold or too hot.  -->>  Continue 100 mcg levothyroxine.  We will recheck in 6 to 12 months.  -Vitamin D deficiency.  30, is on vitamin D, 1000 daily, only taking it once in a while. -->>  Recommend increasing to 5000 units daily of vitamin D3.  We will recheck in about 3 to 6 months.    Regarding previous labs: Annual labs due around August.  -Drug therapy, screening for conditions.  A1c is 5.4, so no diabetes.  -Hyperlipidemia: HDL 51, was 57, goal is 45 or more.  LDL is 48, was 29, goal is 70 or less.  Total to good ratio is 2.4, was 2.0, goal is less than 2.8.  So cholesterol numbers are great on atorvastatin 20 mg. -->>  We will recheck with next annual labs.  History of anemia post hip surgery.  Otherwise  hemoglobin was within normal limits. -->>  Will be checking annual labs before next appointment.  -Dehydrated. -->>  Drink more water.  A good goal would be 3 to 4 quarts a day.  -Acute UTI.  Patient does note dysuria also. -->>  We will prescribe nitrofurantoin      asthma.  Needing albuterol maybe twice a week. -->>  Will refill as needed.      - Remember to go to the [facility] in about a month to recheck your urine.  Call us 10 to 14 days after if you do not hear any results.  -Return here in about 5 months for annual preventative visit and lab review.  -Go to the [facility] about a week before that appointment to get labs which will include vitamin D, thyroid panel, and urinalysis.

## 2023-11-01 DIAGNOSIS — E03.9 HYPOTHYROIDISM, UNSPECIFIED TYPE: ICD-10-CM

## 2023-11-02 RX ORDER — LEVOTHYROXINE SODIUM 100 UG/1
TABLET ORAL
Qty: 90 TABLET | Refills: 1 | OUTPATIENT
Start: 2023-11-02

## 2023-11-07 ENCOUNTER — TELEPHONE (OUTPATIENT)
Dept: PRIMARY CARE | Facility: CLINIC | Age: 58
End: 2023-11-07
Payer: COMMERCIAL

## 2023-11-07 DIAGNOSIS — E03.9 HYPOTHYROIDISM, UNSPECIFIED TYPE: ICD-10-CM

## 2023-11-07 DIAGNOSIS — E78.5 HYPERLIPIDEMIA, UNSPECIFIED: ICD-10-CM

## 2023-11-07 RX ORDER — LEVOTHYROXINE SODIUM 100 UG/1
TABLET ORAL
Qty: 90 TABLET | Refills: 1 | Status: SHIPPED | OUTPATIENT
Start: 2023-11-07 | End: 2024-05-01

## 2023-11-09 DIAGNOSIS — E03.9 HYPOTHYROIDISM, UNSPECIFIED TYPE: ICD-10-CM

## 2023-11-10 RX ORDER — LEVOTHYROXINE SODIUM 100 UG/1
TABLET ORAL
Qty: 90 TABLET | Refills: 1 | OUTPATIENT
Start: 2023-11-10

## 2023-11-10 RX ORDER — ATORVASTATIN CALCIUM 20 MG/1
TABLET, FILM COATED ORAL
Qty: 90 TABLET | Refills: 3 | Status: SHIPPED | OUTPATIENT
Start: 2023-11-10

## 2023-11-15 LAB
CYTOLOGY CMNT CVX/VAG CYTO-IMP: NORMAL
HPV HR 12 DNA GENITAL QL NAA+PROBE: NEGATIVE
HPV HR GENOTYPES PNL CVX NAA+PROBE: NEGATIVE
HPV16 DNA SPEC QL NAA+PROBE: NEGATIVE
HPV18 DNA SPEC QL NAA+PROBE: NEGATIVE
LAB AP HPV GENOTYPE QUESTION: YES
LAB AP HPV HR: NORMAL
LABORATORY COMMENT REPORT: NORMAL
PATH REPORT.TOTAL CANCER: NORMAL

## 2023-11-27 ENCOUNTER — LAB (OUTPATIENT)
Dept: LAB | Facility: LAB | Age: 58
End: 2023-11-27
Payer: COMMERCIAL

## 2023-11-27 DIAGNOSIS — N30.01 ACUTE CYSTITIS WITH HEMATURIA: ICD-10-CM

## 2023-11-27 LAB
APPEARANCE UR: CLEAR
BACTERIA #/AREA URNS AUTO: ABNORMAL /HPF
BILIRUB UR STRIP.AUTO-MCNC: NEGATIVE MG/DL
COLOR UR: YELLOW
GLUCOSE UR STRIP.AUTO-MCNC: NEGATIVE MG/DL
HOLD SPECIMEN: NORMAL
KETONES UR STRIP.AUTO-MCNC: NEGATIVE MG/DL
LEUKOCYTE ESTERASE UR QL STRIP.AUTO: ABNORMAL
MUCOUS THREADS #/AREA URNS AUTO: ABNORMAL /LPF
NITRITE UR QL STRIP.AUTO: NEGATIVE
PH UR STRIP.AUTO: 6 [PH]
PROT UR STRIP.AUTO-MCNC: NEGATIVE MG/DL
RBC # UR STRIP.AUTO: NEGATIVE /UL
RBC #/AREA URNS AUTO: ABNORMAL /HPF
SP GR UR STRIP.AUTO: 1.03
SQUAMOUS #/AREA URNS AUTO: ABNORMAL /HPF
UROBILINOGEN UR STRIP.AUTO-MCNC: 4 MG/DL
WBC #/AREA URNS AUTO: ABNORMAL /HPF

## 2023-11-27 PROCEDURE — 81001 URINALYSIS AUTO W/SCOPE: CPT

## 2023-11-27 PROCEDURE — 87086 URINE CULTURE/COLONY COUNT: CPT

## 2023-11-28 LAB — BACTERIA UR CULT: NORMAL

## 2023-12-05 ENCOUNTER — TELEPHONE (OUTPATIENT)
Dept: CARDIOLOGY | Facility: CLINIC | Age: 58
End: 2023-12-05
Payer: COMMERCIAL

## 2023-12-05 NOTE — TELEPHONE ENCOUNTER
Patient left message asking if she needs labs before office visit with Dr Linn soon. I reviewed chart and labs were done in August. Looks like Dr Linn reviews Dr Porter labs. I called patient and left message no labs are needed.

## 2023-12-11 ENCOUNTER — OFFICE VISIT (OUTPATIENT)
Dept: CARDIOLOGY | Facility: CLINIC | Age: 58
End: 2023-12-11
Payer: COMMERCIAL

## 2023-12-11 VITALS
BODY MASS INDEX: 35.68 KG/M2 | DIASTOLIC BLOOD PRESSURE: 66 MMHG | HEIGHT: 58 IN | WEIGHT: 170 LBS | SYSTOLIC BLOOD PRESSURE: 122 MMHG | HEART RATE: 72 BPM

## 2023-12-11 DIAGNOSIS — R07.9 CHEST PAIN, UNSPECIFIED TYPE: Primary | ICD-10-CM

## 2023-12-11 DIAGNOSIS — R00.1 SINUS BRADYCARDIA: ICD-10-CM

## 2023-12-11 DIAGNOSIS — Z98.61 CAD S/P PERCUTANEOUS CORONARY ANGIOPLASTY: ICD-10-CM

## 2023-12-11 DIAGNOSIS — I25.10 CAD S/P PERCUTANEOUS CORONARY ANGIOPLASTY: ICD-10-CM

## 2023-12-11 DIAGNOSIS — R00.2 PALPITATIONS: ICD-10-CM

## 2023-12-11 DIAGNOSIS — R06.83 SNORING: ICD-10-CM

## 2023-12-11 DIAGNOSIS — J45.20 MILD INTERMITTENT ASTHMA, UNSPECIFIED WHETHER COMPLICATED (HHS-HCC): ICD-10-CM

## 2023-12-11 DIAGNOSIS — R06.02 SHORTNESS OF BREATH: ICD-10-CM

## 2023-12-11 DIAGNOSIS — E03.9 HYPOTHYROIDISM, UNSPECIFIED TYPE: ICD-10-CM

## 2023-12-11 DIAGNOSIS — Z87.891 FORMER SMOKER: ICD-10-CM

## 2023-12-11 DIAGNOSIS — E78.5 DYSLIPIDEMIA: ICD-10-CM

## 2023-12-11 PROCEDURE — 99214 OFFICE O/P EST MOD 30 MIN: CPT | Performed by: INTERNAL MEDICINE

## 2023-12-11 PROCEDURE — 1036F TOBACCO NON-USER: CPT | Performed by: INTERNAL MEDICINE

## 2023-12-11 PROCEDURE — 3008F BODY MASS INDEX DOCD: CPT | Performed by: INTERNAL MEDICINE

## 2023-12-11 RX ORDER — NITROGLYCERIN 0.4 MG/1
0.4 TABLET SUBLINGUAL EVERY 5 MIN PRN
Qty: 25 TABLET | Refills: 5 | Status: SHIPPED | OUTPATIENT
Start: 2023-12-11

## 2023-12-11 NOTE — PROGRESS NOTES
CARDIOLOGY OFFICE VISIT      CHIEF COMPLAINT  Chief Complaint   Patient presents with    Follow-up     1 year        HISTORY OF PRESENT ILLNESS  Patient is 58-year-old  female with past medical history significant for coronary artery disease status post non-ST elevation myocardial infarction status post drug-eluting stents right coronary artery and ramus branch 2019, dyslipidemia, asthma, hypothyroid, obesity who presents for follow cardiovascular care.      Patient denies chest pain, dyspnea, palpitations, nausea, vomiting, dizziness, near-syncope, gautam syncope, edema, claudication.  No formal exercise program.       Past surgical history:    Right total hip replacement      Social history:  Quit smoking 2.5 years ago, smoked one pack per day for 32 years  Occasional alcohol use     Family history:  Mom  62 COPD  Dad  73 congestive heart failure, coronary artery disease first myocardial infarction at age 63, sister history of thyroid disease and asthma     Review systems are negative, noncontributory, orders previous mentioned Ã--12 systems     I personally reviewed EKG 2019: Sinus bradycardia 52 bpm     Assessment:    Coronary artery disease/NSTEMI/ RUBY right coronary artery and ramus 2019  Dyslipidemia  Asymptomatic sinus bradycardia  History of asystole at time of sheath removal post cardiac catheter presumed vagal  Asthma  Hypothyroid  Anxiety  Obesity  Fatigue             Recommendations:  Patient appears to be stable from a cardiac standpoint.  No symptoms of angina and no ischemia on most recent stress test.  No symptomatic arrhythmia.  No symptomatic bradycardia.  No evidence of heart failure.  Blood pressure under control.  Lipid profile within acceptable limits.  Advise diet, weight loss, exercise program 30 minutes/day.  Follow-up with myself in 1 year.  In 1 year obtain exercise nuclear stress test.  Routine lab work through PCP.      Please excuse any errors in grammar or translation related to this dictation. Voice recognition software was utilized to prepare this document.        Recent Cardiovascular Testing:  The following results have been reviewed and updated.    Labs: 8/1/23  1.T C 123, , HDL 50, LDL 48        Stress Kourtney 9/8/21  1. Normal  2. EF 75%  3. 8.5 METS     Echo: 1/7/19  1.EF 60-65%.      Overnight pulse oximetry: 7/16/19  1. Low SpO2 90.0%.     KOH:P 7/22/19  1. NSR 55-63 bpm.  2. Palpitations associated with NSR.  3. No dysrhythmia.          VITALS  Vitals:    12/11/23 1531   BP: 122/66   Pulse: 72     Wt Readings from Last 4 Encounters:   12/11/23 77.1 kg (170 lb)   10/31/23 76.2 kg (168 lb)   08/08/23 76.7 kg (169 lb)   06/06/23 76.2 kg (167 lb 14.4 oz)       PHYSICAL EXAM:  GENERAL:  Well developed, well nourished, in no acute distress.  CHEST:  Symmetric and nontender.  NEURO/PSYCH:  Alert and oriented times three with approppriate behavior and responses.  NECK:  Supple, no JVD, no bruit.  LUNGS:  Clear to auscultation bilaterally, normal respiratory effort.  HEART:  Rate and rhythm regular with no evident murmur, no gallop appreciated.                   There are no rubs, clicks or heaves.  EXTREMITIES:  Warm with good color, no clubbing or cyanosis.  There is no edema noted.  PERIPHERAL VASCULAR:  Pulses present and equally palpable; 2+ throughout.    ASSESSMENT AND PLAN  Diagnoses and all orders for this visit:  Chest pain, unspecified type  Shortness of breath  Palpitations  CAD S/P percutaneous coronary angioplasty  Dyslipidemia  Sinus bradycardia  Hypothyroidism, unspecified type  Mild intermittent asthma, unspecified whether complicated  Snoring  Former smoker  BMI 35.0-35.9,adult      Past Medical History  Past Medical History:   Diagnosis Date    Acute frontal sinusitis, unspecified     Acute frontal sinusitis    Acute laryngitis     Acute laryngitis    Other conditions influencing health status      History of cough    Other specified abnormal findings of blood chemistry     Elevated TSH    Personal history of nicotine dependence     History of nicotine dependence    Personal history of other diseases of the circulatory system     History of carotid artery stenosis    Personal history of other diseases of the digestive system     History of constipation    Personal history of other diseases of the digestive system     History of hiatal hernia    Personal history of other diseases of the musculoskeletal system and connective tissue     History of neck pain    Personal history of other diseases of the respiratory system     History of chronic obstructive lung disease    Personal history of other specified conditions     History of diarrhea    Personal history of other specified conditions     History of nasal congestion    Personal history of other specified conditions     History of vomiting    Personal history of pneumonia (recurrent)     History of pneumonia    Right lower quadrant pain     Abdominal pain, RLQ (right lower quadrant)    Right upper quadrant pain     Abdominal pain, right upper quadrant    Unilateral primary osteoarthritis, right hip 2021    Osteoarthritis of right hip       Social History  Social History     Tobacco Use    Smoking status: Former     Types: Cigarettes     Quit date:      Years since quittin.9    Smokeless tobacco: Never   Substance Use Topics    Alcohol use: Not Currently    Drug use: Never       Family History     Family History   Problem Relation Name Age of Onset    COPD Mother      Heart failure Father      Coronary artery disease Father      Hypertension Father      Heart attack Father      Asthma Sister      Thyroid disease Sister          Allergies:  Allergies   Allergen Reactions    Codeine Unknown    Penicillins Unknown        Outpatient Medications:  Current Outpatient Medications   Medication Instructions    acetaminophen (Tylenol) 500 mg tablet 1  "tablet, oral, Every 6 hours PRN    albuterol (ProAir HFA) 90 mcg/actuation inhaler 1-2 puffs, inhalation, Every 4 hours PRN    aspirin 81 mg chewable tablet 1 tablet, oral, Daily RT    atorvastatin (Lipitor) 20 mg tablet TAKE ONE-HALF TABLET AT BEDTIME    busPIRone (Buspar) 5 mg tablet 1 tablet, oral, Daily    cetirizine (ZyrTEC) 10 mg capsule oral, As needed    coenzyme Q-10 100 mg capsule 1 capsule, oral, Daily    cyclobenzaprine (Flexeril) 10 mg tablet 1 tablet, oral, Nightly    docusate sodium (Colace) 100 mg capsule 1 capsule, oral, Daily    isosorbide mononitrate ER (Imdur) 30 mg 24 hr tablet 1 tablet, oral, Daily, Do not crush or chew.     levothyroxine (Synthroid, Levoxyl) 100 mcg tablet 1 by mouth daily first thing in the morning with half a glass of water on an empty stomach 30 minutes before any food.    montelukast (Singulair) 10 mg tablet TAKE 1 TABLET DAILY    nitroglycerin (Nitrostat) 0.4 mg SL tablet 1 tablet, sublingual, Every 5 min PRN    polyethylene glycol (GLYCOLAX, MIRALAX) 17 g, oral, Daily    triamcinolone (Nasacort) 55 mcg nasal inhaler 1 spray, nasal, Daily RT        Recent Lab Results:    CMP:    Lab Results   Component Value Date     08/01/2023    K 4.3 08/01/2023     08/01/2023    CO2 28 08/01/2023    BUN 16 08/01/2023    CREATININE 0.63 08/01/2023    GLUCOSE 95 08/01/2023    CALCIUM 9.2 08/01/2023       Magnesium:    Lab Results   Component Value Date    MG 2.18 08/01/2023       Lipid Profile:    Lab Results   Component Value Date    TRIG 120 08/01/2023    HDL 50.7 08/01/2023       TSH:    Lab Results   Component Value Date    TSH 0.40 (L) 10/25/2023       BNP:   No results found for: \"BNP\"     PT/INR:    Lab Results   Component Value Date    PROTIME 13.1 10/28/2021    INR 1.1 10/28/2021       HgBA1c:    Lab Results   Component Value Date    HGBA1C 5.4 08/01/2023       BMP:  Lab Results   Component Value Date     08/01/2023     09/28/2022     06/15/2022    " "K 4.3 08/01/2023    K 4.1 09/28/2022    K 4.2 06/15/2022     08/01/2023     09/28/2022     06/15/2022    CO2 28 08/01/2023    CO2 28 09/28/2022    CO2 27 06/15/2022    BUN 16 08/01/2023    BUN 11 09/28/2022    BUN 13 06/15/2022    CREATININE 0.63 08/01/2023    CREATININE 0.66 09/28/2022    CREATININE 0.64 06/15/2022       CBC:  Lab Results   Component Value Date    WBC 6.5 08/01/2023    WBC 9.2 11/05/2021    WBC 5.7 10/28/2021    RBC 4.59 08/01/2023    RBC 3.77 (L) 11/05/2021    RBC 4.49 10/28/2021    HGB 14.0 08/01/2023    HGB 11.3 (L) 11/05/2021    HGB 13.4 10/28/2021    HCT 41.7 08/01/2023    HCT 34.8 (L) 11/05/2021    HCT 41.8 10/28/2021    MCV 91 08/01/2023    MCV 92 11/05/2021    MCV 93 10/28/2021    MCHC 33.6 08/01/2023    MCHC 32.5 11/05/2021    MCHC 32.1 10/28/2021    RDW 13.4 08/01/2023    RDW 13.3 11/05/2021    RDW 12.8 10/28/2021     08/01/2023     11/05/2021     10/28/2021       Cardiac Enzymes:    No results found for: \"TROPHS\"    Hepatic Function Panel:    Lab Results   Component Value Date    ALKPHOS 77 08/01/2023    ALT 22 08/01/2023    AST 18 08/01/2023    PROT 6.7 08/01/2023    BILITOT 0.6 08/01/2023           Ezequiel Linn, DO        "

## 2024-03-07 DIAGNOSIS — E78.1 PURE HYPERGLYCERIDEMIA: ICD-10-CM

## 2024-03-07 RX ORDER — ICOSAPENT ETHYL 1 G/1
2 CAPSULE ORAL 2 TIMES DAILY
Qty: 360 CAPSULE | Refills: 3 | Status: SHIPPED | OUTPATIENT
Start: 2024-03-07

## 2024-03-11 ENCOUNTER — LAB (OUTPATIENT)
Dept: LAB | Facility: LAB | Age: 59
End: 2024-03-11
Payer: COMMERCIAL

## 2024-03-11 DIAGNOSIS — N30.01 ACUTE CYSTITIS WITH HEMATURIA: ICD-10-CM

## 2024-03-11 DIAGNOSIS — E03.9 HYPOTHYROIDISM, UNSPECIFIED TYPE: ICD-10-CM

## 2024-03-11 DIAGNOSIS — E55.9 VITAMIN D DEFICIENCY: ICD-10-CM

## 2024-03-11 LAB
25(OH)D3 SERPL-MCNC: 21 NG/ML (ref 30–100)
APPEARANCE UR: CLEAR
BILIRUB UR STRIP.AUTO-MCNC: NEGATIVE MG/DL
COLOR UR: YELLOW
GLUCOSE UR STRIP.AUTO-MCNC: NEGATIVE MG/DL
HOLD SPECIMEN: NORMAL
HYALINE CASTS #/AREA URNS AUTO: ABNORMAL /LPF
KETONES UR STRIP.AUTO-MCNC: ABNORMAL MG/DL
LEUKOCYTE ESTERASE UR QL STRIP.AUTO: ABNORMAL
MUCOUS THREADS #/AREA URNS AUTO: ABNORMAL /LPF
NITRITE UR QL STRIP.AUTO: NEGATIVE
PH UR STRIP.AUTO: 5 [PH]
PROT UR STRIP.AUTO-MCNC: NEGATIVE MG/DL
RBC # UR STRIP.AUTO: NEGATIVE /UL
RBC #/AREA URNS AUTO: ABNORMAL /HPF
SP GR UR STRIP.AUTO: 1.02
SQUAMOUS #/AREA URNS AUTO: ABNORMAL /HPF
T3FREE SERPL-MCNC: 3.6 PG/ML (ref 2.3–4.2)
T4 FREE SERPL-MCNC: 0.97 NG/DL (ref 0.61–1.12)
TSH SERPL-ACNC: 0.2 MIU/L (ref 0.44–3.98)
UROBILINOGEN UR STRIP.AUTO-MCNC: 2 MG/DL
WBC #/AREA URNS AUTO: ABNORMAL /HPF

## 2024-03-11 PROCEDURE — 84439 ASSAY OF FREE THYROXINE: CPT

## 2024-03-11 PROCEDURE — 81001 URINALYSIS AUTO W/SCOPE: CPT

## 2024-03-11 PROCEDURE — 87086 URINE CULTURE/COLONY COUNT: CPT

## 2024-03-11 PROCEDURE — 84443 ASSAY THYROID STIM HORMONE: CPT

## 2024-03-11 PROCEDURE — 36415 COLL VENOUS BLD VENIPUNCTURE: CPT

## 2024-03-11 PROCEDURE — 82306 VITAMIN D 25 HYDROXY: CPT

## 2024-03-11 PROCEDURE — 84481 FREE ASSAY (FT-3): CPT

## 2024-03-12 LAB — BACTERIA UR CULT: NORMAL

## 2024-03-20 ENCOUNTER — TELEMEDICINE (OUTPATIENT)
Dept: PRIMARY CARE | Facility: CLINIC | Age: 59
End: 2024-03-20
Payer: COMMERCIAL

## 2024-03-20 DIAGNOSIS — Z13.9 SCREENING FOR CONDITION: ICD-10-CM

## 2024-03-20 DIAGNOSIS — Z79.899 DRUG THERAPY: ICD-10-CM

## 2024-03-20 DIAGNOSIS — Z00.00 PREVENTATIVE HEALTH CARE: Primary | ICD-10-CM

## 2024-03-20 DIAGNOSIS — E03.9 HYPOTHYROIDISM, UNSPECIFIED TYPE: ICD-10-CM

## 2024-03-20 DIAGNOSIS — E78.5 DYSLIPIDEMIA: ICD-10-CM

## 2024-03-20 DIAGNOSIS — E55.9 VITAMIN D DEFICIENCY: ICD-10-CM

## 2024-03-20 DIAGNOSIS — J01.10 ACUTE FRONTAL SINUSITIS, RECURRENCE NOT SPECIFIED: ICD-10-CM

## 2024-03-20 PROCEDURE — 99443 PR PHYS/QHP TELEPHONE EVALUATION 21-30 MIN: CPT | Performed by: FAMILY MEDICINE

## 2024-03-20 PROCEDURE — 1036F TOBACCO NON-USER: CPT | Performed by: FAMILY MEDICINE

## 2024-03-20 PROCEDURE — 3008F BODY MASS INDEX DOCD: CPT | Performed by: FAMILY MEDICINE

## 2024-03-20 RX ORDER — DOXYCYCLINE 100 MG/1
CAPSULE ORAL
Qty: 20 CAPSULE | Refills: 0 | Status: SHIPPED | OUTPATIENT
Start: 2024-03-20

## 2024-03-20 NOTE — PROGRESS NOTES
Subjective   Patient ID: Scotty Vazquez is a 59 y.o. female who presents for Virtual Visit (Pt being seen virtually for 5 month FU).    Review of Systems  Denies N/V/D/C, no HA/S/V, denies rashes/lesions, no CP/SOB. Denies fevers/chills.  All other systems were negative.    Objective   Physical Exam    Gen: NAD  ENT: hearing grossly intact, no sniffling noted  resp: no coughing noted, no SOB noted  neuro: Cranial nerves of speech and hearing (5, 7, 8, 9, 10, 12) grossly intact  psych: A&Ox3, recent and remote memory grossly intact, affect normal range, pleasant mood    Assessment/Plan   Diagnoses and all orders for this visit:  Preventative health care  -     CBC and Auto Differential; Future  -     Comprehensive Metabolic Panel; Future  -     Magnesium; Future  -     Lipid Panel; Future  -     Hemoglobin A1C; Future  -     Urinalysis with Reflex Microscopic; Future  -     Vitamin D 25-Hydroxy,Total (for eval of Vitamin D levels); Future  -     Thyroxine, Free; Future  -     Triiodothyronine, Free; Future  -     Thyroid Stimulating Hormone; Future  Acute frontal sinusitis, recurrence not specified  -     doxycycline (Monodox) 100 mg capsule; 1 by mouth twice daily with food for 10 days.  Drug therapy  -     CBC and Auto Differential; Future  -     Comprehensive Metabolic Panel; Future  -     Magnesium; Future  -     Urinalysis with Reflex Microscopic; Future  Screening for condition  -     Lipid Panel; Future  -     Hemoglobin A1C; Future  Hypothyroidism, unspecified type  -     Thyroxine, Free; Future  -     Triiodothyronine, Free; Future  -     Thyroid Stimulating Hormone; Future  Vitamin D deficiency  -     Vitamin D 25-Hydroxy,Total (for eval of Vitamin D levels); Future  Dyslipidemia  -     Lipid Panel; Future      Next appointment will be preventative visit with lab review.      Screening for cervical cancer.  Pap with HPV negative 2023.  Repeat around 2028.        New labs reviewed:       -Hypothyroidism,  0.2, free T4 about stiff percentile of normal range, free T3 also about 60th percentile of normal range.  On 100 mcg daily levothyroxine.  Denies being too cold or too hot.  -->>  Continue 100 mcg levothyroxine.  With annual labs around August 1.      -Vitamin D deficiency.  21, was 30.  Goal is .-->>  Recommend increasing to 5000 units daily of vitamin D3.  We will recheck in about 3 to 6 months.        asthma.  Needing albuterol maybe twice a week. -->>  Will refill as needed.      Acute sinusitis, having periorbital pain and pressure especially frontal sinuses.  Has been having symptoms for about 2 weeks including postnasal drip, scratchy cough, itchy watery eyes.  Is already using Nasacort. -->>  Prescribe doxycycline for 10 days.  Nasal steroid spray daily.  Also take Tylenol or ibuprofen as needed for symptoms.          -Patient will go to the [facility] [address] around August 1 to get fasting annual labs including a thyroid panel drawn.  -Will schedule in person appointment first week of August for annual lab review and annual preventative visit.

## 2024-03-20 NOTE — PATIENT INSTRUCTIONS
Next appointment will be preventative visit with lab review.      Screening for cervical cancer.  Pap with HPV negative 2023.  Repeat around 2028.        New labs reviewed:       -Hypothyroidism, 0.2, free T4 about stiff percentile of normal range, free T3 also about 60th percentile of normal range.  On 100 mcg daily levothyroxine.  Denies being too cold or too hot.  -->>  Continue 100 mcg levothyroxine.  With annual labs around August 1.      -Vitamin D deficiency.  21, was 30.  Goal is .-->>  Recommend increasing to 5000 units daily of vitamin D3.  We will recheck in about 3 to 6 months.        asthma.  Needing albuterol maybe twice a week. -->>  Will refill as needed.      Acute sinusitis, having periorbital pain and pressure especially frontal sinuses.  Has been having symptoms for about 2 weeks including postnasal drip, scratchy cough, itchy watery eyes.  Is already using Nasacort. -->>  Prescribe doxycycline for 10 days.  Nasal steroid spray daily.  Also take Tylenol or ibuprofen as needed for symptoms.          -Patient will go to the [facility] around August 1 to get fasting annual labs including a thyroid panel drawn.  -Will schedule in person appointment first week of August for annual lab review and annual preventative visit.

## 2024-03-21 ENCOUNTER — APPOINTMENT (OUTPATIENT)
Dept: PRIMARY CARE | Facility: CLINIC | Age: 59
End: 2024-03-21
Payer: COMMERCIAL

## 2024-03-27 ENCOUNTER — APPOINTMENT (OUTPATIENT)
Dept: PRIMARY CARE | Facility: CLINIC | Age: 59
End: 2024-03-27
Payer: COMMERCIAL

## 2024-05-01 DIAGNOSIS — E03.9 HYPOTHYROIDISM, UNSPECIFIED TYPE: ICD-10-CM

## 2024-05-01 RX ORDER — LEVOTHYROXINE SODIUM 100 UG/1
TABLET ORAL
Qty: 90 TABLET | Refills: 1 | Status: SHIPPED | OUTPATIENT
Start: 2024-05-01

## 2024-08-12 ENCOUNTER — APPOINTMENT (OUTPATIENT)
Dept: CARDIOLOGY | Facility: HOSPITAL | Age: 59
End: 2024-08-12
Payer: COMMERCIAL

## 2024-08-12 ENCOUNTER — APPOINTMENT (OUTPATIENT)
Dept: RADIOLOGY | Facility: HOSPITAL | Age: 59
End: 2024-08-12
Payer: COMMERCIAL

## 2024-08-12 ENCOUNTER — LAB (OUTPATIENT)
Dept: LAB | Facility: LAB | Age: 59
End: 2024-08-12
Payer: COMMERCIAL

## 2024-08-12 ENCOUNTER — HOSPITAL ENCOUNTER (OUTPATIENT)
Facility: HOSPITAL | Age: 59
Setting detail: OBSERVATION
Discharge: HOME | End: 2024-08-14
Attending: INTERNAL MEDICINE | Admitting: STUDENT IN AN ORGANIZED HEALTH CARE EDUCATION/TRAINING PROGRAM
Payer: COMMERCIAL

## 2024-08-12 DIAGNOSIS — M25.511 ACUTE PAIN OF RIGHT SHOULDER: ICD-10-CM

## 2024-08-12 DIAGNOSIS — E03.9 HYPOTHYROIDISM, UNSPECIFIED TYPE: ICD-10-CM

## 2024-08-12 DIAGNOSIS — R94.31 ABNORMAL EKG: ICD-10-CM

## 2024-08-12 DIAGNOSIS — Z13.9 SCREENING FOR CONDITION: ICD-10-CM

## 2024-08-12 DIAGNOSIS — R53.1 ACUTE LEFT-SIDED WEAKNESS: Primary | ICD-10-CM

## 2024-08-12 DIAGNOSIS — E78.5 DYSLIPIDEMIA: ICD-10-CM

## 2024-08-12 DIAGNOSIS — E55.9 VITAMIN D DEFICIENCY: ICD-10-CM

## 2024-08-12 DIAGNOSIS — Z79.899 DRUG THERAPY: ICD-10-CM

## 2024-08-12 DIAGNOSIS — Z00.00 PREVENTATIVE HEALTH CARE: ICD-10-CM

## 2024-08-12 PROBLEM — R20.0 LEFT SIDED NUMBNESS: Status: ACTIVE | Noted: 2024-08-12

## 2024-08-12 LAB
25(OH)D3 SERPL-MCNC: 20 NG/ML (ref 30–100)
ALBUMIN SERPL BCP-MCNC: 4.1 G/DL (ref 3.4–5)
ALP SERPL-CCNC: 78 U/L (ref 33–136)
ALT SERPL W P-5'-P-CCNC: 29 U/L (ref 7–45)
ANION GAP SERPL CALC-SCNC: 12 MMOL/L (ref 10–20)
APPEARANCE UR: CLEAR
APPEARANCE UR: CLEAR
APTT PPP: 33 SECONDS (ref 27–38)
AST SERPL W P-5'-P-CCNC: 17 U/L (ref 9–39)
BACTERIA #/AREA URNS AUTO: ABNORMAL /HPF
BASOPHILS # BLD AUTO: 0.02 X10*3/UL (ref 0–0.1)
BASOPHILS NFR BLD AUTO: 0.3 %
BILIRUB SERPL-MCNC: 0.9 MG/DL (ref 0–1.2)
BILIRUB UR STRIP.AUTO-MCNC: NEGATIVE MG/DL
BILIRUB UR STRIP.AUTO-MCNC: NEGATIVE MG/DL
BNP SERPL-MCNC: 48 PG/ML (ref 0–99)
BUN SERPL-MCNC: 13 MG/DL (ref 6–23)
CALCIUM SERPL-MCNC: 9.1 MG/DL (ref 8.6–10.3)
CARDIAC TROPONIN I PNL SERPL HS: 10 NG/L (ref 0–13)
CARDIAC TROPONIN I PNL SERPL HS: 8 NG/L (ref 0–13)
CHLORIDE SERPL-SCNC: 104 MMOL/L (ref 98–107)
CHOLEST SERPL-MCNC: 126 MG/DL (ref 0–199)
CHOLESTEROL/HDL RATIO: 2.3
CO2 SERPL-SCNC: 28 MMOL/L (ref 21–32)
COLOR UR: ABNORMAL
COLOR UR: COLORLESS
CREAT SERPL-MCNC: 0.65 MG/DL (ref 0.5–1.05)
D DIMER PPP FEU-MCNC: 225 NG/ML FEU
EGFRCR SERPLBLD CKD-EPI 2021: >90 ML/MIN/1.73M*2
EOSINOPHIL # BLD AUTO: 0.19 X10*3/UL (ref 0–0.7)
EOSINOPHIL NFR BLD AUTO: 2.7 %
ERYTHROCYTE [DISTWIDTH] IN BLOOD BY AUTOMATED COUNT: 12.8 % (ref 11.5–14.5)
EST. AVERAGE GLUCOSE BLD GHB EST-MCNC: 108 MG/DL
GLUCOSE SERPL-MCNC: 106 MG/DL (ref 74–99)
GLUCOSE UR STRIP.AUTO-MCNC: NORMAL MG/DL
GLUCOSE UR STRIP.AUTO-MCNC: NORMAL MG/DL
HBA1C MFR BLD: 5.4 %
HCT VFR BLD AUTO: 42 % (ref 36–46)
HDLC SERPL-MCNC: 55 MG/DL
HGB BLD-MCNC: 14 G/DL (ref 12–16)
IMM GRANULOCYTES # BLD AUTO: 0.02 X10*3/UL (ref 0–0.7)
IMM GRANULOCYTES NFR BLD AUTO: 0.3 % (ref 0–0.9)
INR PPP: 1.1 (ref 0.9–1.1)
KETONES UR STRIP.AUTO-MCNC: NEGATIVE MG/DL
KETONES UR STRIP.AUTO-MCNC: NEGATIVE MG/DL
LACTATE SERPL-SCNC: 1.5 MMOL/L (ref 0.4–2)
LDLC SERPL CALC-MCNC: 39 MG/DL
LEUKOCYTE ESTERASE UR QL STRIP.AUTO: ABNORMAL
LEUKOCYTE ESTERASE UR QL STRIP.AUTO: NEGATIVE
LYMPHOCYTES # BLD AUTO: 2.58 X10*3/UL (ref 1.2–4.8)
LYMPHOCYTES NFR BLD AUTO: 37.1 %
MAGNESIUM SERPL-MCNC: 1.89 MG/DL (ref 1.6–2.4)
MCH RBC QN AUTO: 30.2 PG (ref 26–34)
MCHC RBC AUTO-ENTMCNC: 33.3 G/DL (ref 32–36)
MCV RBC AUTO: 91 FL (ref 80–100)
MONOCYTES # BLD AUTO: 0.44 X10*3/UL (ref 0.1–1)
MONOCYTES NFR BLD AUTO: 6.3 %
MUCOUS THREADS #/AREA URNS AUTO: ABNORMAL /LPF
NEUTROPHILS # BLD AUTO: 3.7 X10*3/UL (ref 1.2–7.7)
NEUTROPHILS NFR BLD AUTO: 53.3 %
NITRITE UR QL STRIP.AUTO: NEGATIVE
NITRITE UR QL STRIP.AUTO: NEGATIVE
NON HDL CHOLESTEROL: 71 MG/DL (ref 0–149)
NRBC BLD-RTO: 0 /100 WBCS (ref 0–0)
PH UR STRIP.AUTO: 5.5 [PH]
PH UR STRIP.AUTO: 7 [PH]
PLATELET # BLD AUTO: 243 X10*3/UL (ref 150–450)
POTASSIUM SERPL-SCNC: 4.3 MMOL/L (ref 3.5–5.3)
PROT SERPL-MCNC: 6.4 G/DL (ref 6.4–8.2)
PROT UR STRIP.AUTO-MCNC: NEGATIVE MG/DL
PROT UR STRIP.AUTO-MCNC: NEGATIVE MG/DL
PROTHROMBIN TIME: 12.3 SECONDS (ref 9.8–12.8)
RBC # BLD AUTO: 4.63 X10*6/UL (ref 4–5.2)
RBC # UR STRIP.AUTO: NEGATIVE /UL
RBC # UR STRIP.AUTO: NEGATIVE /UL
RBC #/AREA URNS AUTO: ABNORMAL /HPF
SODIUM SERPL-SCNC: 140 MMOL/L (ref 136–145)
SP GR UR STRIP.AUTO: 1.01
SP GR UR STRIP.AUTO: 1.04
SQUAMOUS #/AREA URNS AUTO: ABNORMAL /HPF
T3FREE SERPL-MCNC: 3.7 PG/ML (ref 2.3–4.2)
T4 FREE SERPL-MCNC: 1.02 NG/DL (ref 0.61–1.12)
TRIGL SERPL-MCNC: 159 MG/DL (ref 0–149)
TSH SERPL-ACNC: 0.34 MIU/L (ref 0.44–3.98)
UROBILINOGEN UR STRIP.AUTO-MCNC: NORMAL MG/DL
UROBILINOGEN UR STRIP.AUTO-MCNC: NORMAL MG/DL
VLDL: 32 MG/DL (ref 0–40)
WBC # BLD AUTO: 7 X10*3/UL (ref 4.4–11.3)
WBC #/AREA URNS AUTO: >50 /HPF

## 2024-08-12 PROCEDURE — 83880 ASSAY OF NATRIURETIC PEPTIDE: CPT | Performed by: INTERNAL MEDICINE

## 2024-08-12 PROCEDURE — 70450 CT HEAD/BRAIN W/O DYE: CPT | Performed by: RADIOLOGY

## 2024-08-12 PROCEDURE — 85025 COMPLETE CBC W/AUTO DIFF WBC: CPT

## 2024-08-12 PROCEDURE — 85610 PROTHROMBIN TIME: CPT | Performed by: INTERNAL MEDICINE

## 2024-08-12 PROCEDURE — 80061 LIPID PANEL: CPT

## 2024-08-12 PROCEDURE — 93005 ELECTROCARDIOGRAM TRACING: CPT

## 2024-08-12 PROCEDURE — 84484 ASSAY OF TROPONIN QUANT: CPT | Performed by: INTERNAL MEDICINE

## 2024-08-12 PROCEDURE — 85730 THROMBOPLASTIN TIME PARTIAL: CPT | Performed by: INTERNAL MEDICINE

## 2024-08-12 PROCEDURE — 84443 ASSAY THYROID STIM HORMONE: CPT

## 2024-08-12 PROCEDURE — 99222 1ST HOSP IP/OBS MODERATE 55: CPT | Performed by: STUDENT IN AN ORGANIZED HEALTH CARE EDUCATION/TRAINING PROGRAM

## 2024-08-12 PROCEDURE — 36415 COLL VENOUS BLD VENIPUNCTURE: CPT | Performed by: INTERNAL MEDICINE

## 2024-08-12 PROCEDURE — 83605 ASSAY OF LACTIC ACID: CPT | Performed by: INTERNAL MEDICINE

## 2024-08-12 PROCEDURE — 83735 ASSAY OF MAGNESIUM: CPT

## 2024-08-12 PROCEDURE — 85379 FIBRIN DEGRADATION QUANT: CPT | Performed by: INTERNAL MEDICINE

## 2024-08-12 PROCEDURE — G0378 HOSPITAL OBSERVATION PER HR: HCPCS

## 2024-08-12 PROCEDURE — 81003 URINALYSIS AUTO W/O SCOPE: CPT | Performed by: INTERNAL MEDICINE

## 2024-08-12 PROCEDURE — 2550000001 HC RX 255 CONTRASTS: Performed by: INTERNAL MEDICINE

## 2024-08-12 PROCEDURE — 84481 FREE ASSAY (FT-3): CPT

## 2024-08-12 PROCEDURE — 81001 URINALYSIS AUTO W/SCOPE: CPT

## 2024-08-12 PROCEDURE — 70450 CT HEAD/BRAIN W/O DYE: CPT

## 2024-08-12 PROCEDURE — 83036 HEMOGLOBIN GLYCOSYLATED A1C: CPT

## 2024-08-12 PROCEDURE — 71275 CT ANGIOGRAPHY CHEST: CPT

## 2024-08-12 PROCEDURE — 82306 VITAMIN D 25 HYDROXY: CPT

## 2024-08-12 PROCEDURE — 99285 EMERGENCY DEPT VISIT HI MDM: CPT

## 2024-08-12 PROCEDURE — 80053 COMPREHEN METABOLIC PANEL: CPT

## 2024-08-12 PROCEDURE — 71275 CT ANGIOGRAPHY CHEST: CPT | Performed by: RADIOLOGY

## 2024-08-12 PROCEDURE — 84439 ASSAY OF FREE THYROXINE: CPT

## 2024-08-12 ASSESSMENT — LIFESTYLE VARIABLES
HAVE YOU EVER FELT YOU SHOULD CUT DOWN ON YOUR DRINKING: NO
EVER HAD A DRINK FIRST THING IN THE MORNING TO STEADY YOUR NERVES TO GET RID OF A HANGOVER: NO
HAVE PEOPLE ANNOYED YOU BY CRITICIZING YOUR DRINKING: NO
TOTAL SCORE: 0
EVER FELT BAD OR GUILTY ABOUT YOUR DRINKING: NO

## 2024-08-12 ASSESSMENT — PAIN DESCRIPTION - PAIN TYPE: TYPE: ACUTE PAIN

## 2024-08-12 ASSESSMENT — COLUMBIA-SUICIDE SEVERITY RATING SCALE - C-SSRS
1. IN THE PAST MONTH, HAVE YOU WISHED YOU WERE DEAD OR WISHED YOU COULD GO TO SLEEP AND NOT WAKE UP?: NO
2. HAVE YOU ACTUALLY HAD ANY THOUGHTS OF KILLING YOURSELF?: NO
6. HAVE YOU EVER DONE ANYTHING, STARTED TO DO ANYTHING, OR PREPARED TO DO ANYTHING TO END YOUR LIFE?: NO

## 2024-08-12 ASSESSMENT — PAIN DESCRIPTION - ORIENTATION: ORIENTATION: LEFT

## 2024-08-12 ASSESSMENT — PAIN DESCRIPTION - LOCATION: LOCATION: ARM

## 2024-08-12 ASSESSMENT — PAIN SCALES - GENERAL: PAINLEVEL_OUTOF10: 6

## 2024-08-12 ASSESSMENT — PAIN - FUNCTIONAL ASSESSMENT: PAIN_FUNCTIONAL_ASSESSMENT: 0-10

## 2024-08-12 NOTE — ED PROVIDER NOTES
HPI   Chief Complaint   Patient presents with    Leg Swelling     BLE swelling X2 months, left arm numbness X3 weeks.  Hx of MI in 2019, CHF.  +SOB       Patient presented for evaluation of left-sided weakness.  Patient notes weakness to the left arm and left leg for the last 3 weeks.  Patient also notes mild swelling to the ankles bilaterally.  Patient states she was trying to walk on a treadmill as well recently and started sweating profusely.  Patient does take aspirin normally.  Patient is a former smoker.  Patient has a history of previous MI.  Patient denies recent trauma.              Patient History   Past Medical History:   Diagnosis Date    Acute frontal sinusitis, unspecified     Acute frontal sinusitis    Acute laryngitis     Acute laryngitis    Other conditions influencing health status     History of cough    Other specified abnormal findings of blood chemistry     Elevated TSH    Personal history of nicotine dependence     History of nicotine dependence    Personal history of other diseases of the circulatory system     History of carotid artery stenosis    Personal history of other diseases of the digestive system     History of constipation    Personal history of other diseases of the digestive system     History of hiatal hernia    Personal history of other diseases of the musculoskeletal system and connective tissue     History of neck pain    Personal history of other diseases of the respiratory system     History of chronic obstructive lung disease    Personal history of other specified conditions     History of diarrhea    Personal history of other specified conditions     History of nasal congestion    Personal history of other specified conditions     History of vomiting    Personal history of pneumonia (recurrent)     History of pneumonia    Right lower quadrant pain     Abdominal pain, RLQ (right lower quadrant)    Right upper quadrant pain     Abdominal pain, right upper quadrant     Unilateral primary osteoarthritis, right hip 2021    Osteoarthritis of right hip     Past Surgical History:   Procedure Laterality Date    OTHER SURGICAL HISTORY  2022    Cardiac catheterization with stent placement    OTHER SURGICAL HISTORY  2022     section    OTHER SURGICAL HISTORY  2022    Hip replacement     Family History   Problem Relation Name Age of Onset    COPD Mother      Heart failure Father      Coronary artery disease Father      Hypertension Father      Heart attack Father      Asthma Sister      Thyroid disease Sister       Social History     Tobacco Use    Smoking status: Former     Current packs/day: 0.00     Types: Cigarettes     Quit date:      Years since quittin.6    Smokeless tobacco: Never   Substance Use Topics    Alcohol use: Not Currently    Drug use: Never       Physical Exam   ED Triage Vitals   Temperature Heart Rate Respirations BP   24 1653 24 1653 24 1653 24 1657   35.8 °C (96.4 °F) 93 20 (!) 179/96      Pulse Ox Temp Source Heart Rate Source Patient Position   24 1653 24 1653 24 1653 24 1653   96 % Temporal Monitor Sitting      BP Location FiO2 (%)     24 1653 --     Right arm        Physical Exam  Vitals and nursing note reviewed.   Constitutional:       Appearance: Normal appearance.   HENT:      Head: Atraumatic.      Right Ear: External ear normal.      Left Ear: External ear normal.      Nose: Nose normal.      Mouth/Throat:      Mouth: Mucous membranes are moist.   Eyes:      Extraocular Movements: Extraocular movements intact.      Pupils: Pupils are equal, round, and reactive to light.   Cardiovascular:      Rate and Rhythm: Normal rate and regular rhythm.      Pulses: Normal pulses.   Pulmonary:      Effort: Pulmonary effort is normal.      Breath sounds: Normal breath sounds.   Abdominal:      Palpations: Abdomen is soft.      Tenderness: There is no abdominal tenderness.    Musculoskeletal:         General: No tenderness. Normal range of motion.      Cervical back: Normal range of motion and neck supple. No rigidity or tenderness.      Right lower leg: No edema.      Left lower leg: No edema.      Right ankle: Swelling present.      Left ankle: Swelling present.      Comments: Mild ankle swelling to the lateral malleolus bilaterally   Skin:     General: Skin is warm and dry.   Neurological:      General: No focal deficit present.      Mental Status: She is alert and oriented to person, place, and time. Mental status is at baseline.      Cranial Nerves: Cranial nerves 2-12 are intact.      Sensory: Sensory deficit present.      Motor: Weakness present.      Comments: Drift in the left arm and left leg.  Sensory deficits in the left arm and left leg compared to the right   Psychiatric:         Mood and Affect: Mood normal.         Behavior: Behavior normal.           ED Course & MDM   ED Course as of 08/12/24 2330   Mon Aug 12, 2024   2022 Reassessed patient.  She continues to have weakness in left arm and left leg. [JA]   2049 Discussed with Dr. Mehta and he accepts the patient to his service.  [JA]      ED Course User Index  [JA] Hay White,          Diagnoses as of 08/12/24 2330   Acute left-sided weakness   Abnormal EKG                 No data recorded     Edgard Coma Scale Score: 15 (08/12/24 1937 : Aydee Kee RN)                           Medical Decision Making  Differential diagnosis: CVA, TIA, MI, PE, other    Patient presenting for evaluation of left arm and left leg weakness.  Patient has NIH stroke scale of 3 on examination.  Last known well 3 weeks ago.  Patient is not a candidate for TNK or thrombectomy due to the last known well.  Patient took aspirin prior to arrival.  Patient also described shortness of breath on exertion.  CT PE negative.  CT head negative.  Troponin negative x 2.  EKG appears nonischemic.  Patient continues to have deficits to the left  arm and left leg.  Patient admitted for further evaluation of stroke.  Review of previous studies show normal carotid ultrasound in 2020.        Procedure  ECG 12 lead    Performed by: Hay White DO  Authorized by: Hay White DO    ECG interpreted by ED Physician in the absence of a cardiologist: yes    Comments:      8/12/2024 16: 56 sinus rhythm with PVCs, rate 95, normal axis, ST elevation in aVR with diffuse depression of the leads, T waves flattened in aVL, abnormal EKG.  EKG interpreted by myself.       Hay White DO  08/12/24 0924

## 2024-08-12 NOTE — Clinical Note
Is the patient on isolation?: No  Do you expect the patient to require telemetry (informational-only for bed management): Yes  Do you expect the patient to require observation or inpt? (informational-only for bed management): Inpatient  Bed Type: Tel emetry [3]

## 2024-08-13 ENCOUNTER — APPOINTMENT (OUTPATIENT)
Dept: RADIOLOGY | Facility: HOSPITAL | Age: 59
End: 2024-08-13
Payer: COMMERCIAL

## 2024-08-13 LAB
ATRIAL RATE: 95 BPM
HOLD SPECIMEN: NORMAL
P AXIS: 78 DEGREES
P OFFSET: 217 MS
P ONSET: 156 MS
PR INTERVAL: 138 MS
Q ONSET: 225 MS
QRS COUNT: 16 BEATS
QRS DURATION: 92 MS
QT INTERVAL: 366 MS
QTC CALCULATION(BAZETT): 459 MS
QTC FREDERICIA: 426 MS
R AXIS: 48 DEGREES
T AXIS: 68 DEGREES
T OFFSET: 408 MS
VENTRICULAR RATE: 95 BPM

## 2024-08-13 PROCEDURE — G0378 HOSPITAL OBSERVATION PER HR: HCPCS

## 2024-08-13 PROCEDURE — 70551 MRI BRAIN STEM W/O DYE: CPT

## 2024-08-13 PROCEDURE — 70551 MRI BRAIN STEM W/O DYE: CPT | Performed by: RADIOLOGY

## 2024-08-13 PROCEDURE — 2500000001 HC RX 250 WO HCPCS SELF ADMINISTERED DRUGS (ALT 637 FOR MEDICARE OP): Performed by: STUDENT IN AN ORGANIZED HEALTH CARE EDUCATION/TRAINING PROGRAM

## 2024-08-13 PROCEDURE — 99232 SBSQ HOSP IP/OBS MODERATE 35: CPT | Performed by: STUDENT IN AN ORGANIZED HEALTH CARE EDUCATION/TRAINING PROGRAM

## 2024-08-13 RX ORDER — NAPROXEN SODIUM 220 MG/1
81 TABLET, FILM COATED ORAL DAILY
Status: DISCONTINUED | OUTPATIENT
Start: 2024-08-13 | End: 2024-08-14 | Stop reason: HOSPADM

## 2024-08-13 RX ORDER — POLYETHYLENE GLYCOL 3350 17 G/17G
17 POWDER, FOR SOLUTION ORAL DAILY
Status: DISCONTINUED | OUTPATIENT
Start: 2024-08-13 | End: 2024-08-14 | Stop reason: HOSPADM

## 2024-08-13 RX ORDER — ACETAMINOPHEN 650 MG/1
650 SUPPOSITORY RECTAL EVERY 4 HOURS PRN
Status: DISCONTINUED | OUTPATIENT
Start: 2024-08-13 | End: 2024-08-14 | Stop reason: HOSPADM

## 2024-08-13 RX ORDER — ATORVASTATIN CALCIUM 10 MG/1
10 TABLET, FILM COATED ORAL NIGHTLY
Status: DISCONTINUED | OUTPATIENT
Start: 2024-08-13 | End: 2024-08-14 | Stop reason: HOSPADM

## 2024-08-13 RX ORDER — CETIRIZINE HYDROCHLORIDE 10 MG/1
10 TABLET ORAL DAILY
COMMUNITY

## 2024-08-13 RX ORDER — ISOSORBIDE MONONITRATE 30 MG/1
30 TABLET, EXTENDED RELEASE ORAL DAILY
Status: DISCONTINUED | OUTPATIENT
Start: 2024-08-13 | End: 2024-08-14 | Stop reason: HOSPADM

## 2024-08-13 RX ORDER — TALC
3 POWDER (GRAM) TOPICAL NIGHTLY PRN
Status: DISCONTINUED | OUTPATIENT
Start: 2024-08-13 | End: 2024-08-14 | Stop reason: HOSPADM

## 2024-08-13 RX ORDER — ONDANSETRON 4 MG/1
4 TABLET, FILM COATED ORAL EVERY 8 HOURS PRN
Status: DISCONTINUED | OUTPATIENT
Start: 2024-08-13 | End: 2024-08-14 | Stop reason: HOSPADM

## 2024-08-13 RX ORDER — ONDANSETRON HYDROCHLORIDE 2 MG/ML
4 INJECTION, SOLUTION INTRAVENOUS EVERY 8 HOURS PRN
Status: DISCONTINUED | OUTPATIENT
Start: 2024-08-13 | End: 2024-08-14 | Stop reason: HOSPADM

## 2024-08-13 RX ORDER — ACETAMINOPHEN 325 MG/1
650 TABLET ORAL EVERY 4 HOURS PRN
Status: DISCONTINUED | OUTPATIENT
Start: 2024-08-13 | End: 2024-08-14 | Stop reason: HOSPADM

## 2024-08-13 RX ORDER — ENOXAPARIN SODIUM 100 MG/ML
40 INJECTION SUBCUTANEOUS EVERY 24 HOURS
Status: DISCONTINUED | OUTPATIENT
Start: 2024-08-13 | End: 2024-08-14 | Stop reason: HOSPADM

## 2024-08-13 RX ORDER — LEVOTHYROXINE SODIUM 100 UG/1
100 TABLET ORAL DAILY
Status: DISCONTINUED | OUTPATIENT
Start: 2024-08-13 | End: 2024-08-14 | Stop reason: HOSPADM

## 2024-08-13 RX ORDER — ACETAMINOPHEN 160 MG/5ML
650 SOLUTION ORAL EVERY 4 HOURS PRN
Status: DISCONTINUED | OUTPATIENT
Start: 2024-08-13 | End: 2024-08-14 | Stop reason: HOSPADM

## 2024-08-13 RX ORDER — BUSPIRONE HYDROCHLORIDE 5 MG/1
5 TABLET ORAL DAILY
Status: DISCONTINUED | OUTPATIENT
Start: 2024-08-13 | End: 2024-08-14 | Stop reason: HOSPADM

## 2024-08-13 RX ORDER — MONTELUKAST SODIUM 10 MG/1
10 TABLET ORAL DAILY
Status: DISCONTINUED | OUTPATIENT
Start: 2024-08-13 | End: 2024-08-14 | Stop reason: HOSPADM

## 2024-08-13 SDOH — SOCIAL STABILITY: SOCIAL INSECURITY: HAVE YOU HAD THOUGHTS OF HARMING ANYONE ELSE?: NO

## 2024-08-13 SDOH — SOCIAL STABILITY: SOCIAL INSECURITY: ARE YOU OR HAVE YOU BEEN THREATENED OR ABUSED PHYSICALLY, EMOTIONALLY, OR SEXUALLY BY ANYONE?: NO

## 2024-08-13 SDOH — SOCIAL STABILITY: SOCIAL INSECURITY: HAVE YOU HAD ANY THOUGHTS OF HARMING ANYONE ELSE?: NO

## 2024-08-13 SDOH — SOCIAL STABILITY: SOCIAL INSECURITY: DO YOU FEEL UNSAFE GOING BACK TO THE PLACE WHERE YOU ARE LIVING?: NO

## 2024-08-13 SDOH — SOCIAL STABILITY: SOCIAL INSECURITY: ARE THERE ANY APPARENT SIGNS OF INJURIES/BEHAVIORS THAT COULD BE RELATED TO ABUSE/NEGLECT?: NO

## 2024-08-13 SDOH — SOCIAL STABILITY: SOCIAL INSECURITY: DO YOU FEEL ANYONE HAS EXPLOITED OR TAKEN ADVANTAGE OF YOU FINANCIALLY OR OF YOUR PERSONAL PROPERTY?: NO

## 2024-08-13 SDOH — SOCIAL STABILITY: SOCIAL INSECURITY: ABUSE: ADULT

## 2024-08-13 SDOH — SOCIAL STABILITY: SOCIAL INSECURITY: WERE YOU ABLE TO COMPLETE ALL THE BEHAVIORAL HEALTH SCREENINGS?: YES

## 2024-08-13 SDOH — SOCIAL STABILITY: SOCIAL INSECURITY: HAS ANYONE EVER THREATENED TO HURT YOUR FAMILY OR YOUR PETS?: NO

## 2024-08-13 SDOH — SOCIAL STABILITY: SOCIAL INSECURITY: DOES ANYONE TRY TO KEEP YOU FROM HAVING/CONTACTING OTHER FRIENDS OR DOING THINGS OUTSIDE YOUR HOME?: NO

## 2024-08-13 ASSESSMENT — PAIN SCALES - GENERAL
PAINLEVEL_OUTOF10: 0 - NO PAIN
PAINLEVEL_OUTOF10: 0 - NO PAIN
PAINLEVEL_OUTOF10: 3
PAINLEVEL_OUTOF10: 2

## 2024-08-13 ASSESSMENT — COGNITIVE AND FUNCTIONAL STATUS - GENERAL
DAILY ACTIVITIY SCORE: 24
MOBILITY SCORE: 24
PATIENT BASELINE BEDBOUND: NO
DAILY ACTIVITIY SCORE: 24
MOBILITY SCORE: 24

## 2024-08-13 ASSESSMENT — ACTIVITIES OF DAILY LIVING (ADL)
GROOMING: INDEPENDENT
PATIENT'S MEMORY ADEQUATE TO SAFELY COMPLETE DAILY ACTIVITIES?: YES
HEARING - RIGHT EAR: FUNCTIONAL
FEEDING YOURSELF: INDEPENDENT
BATHING: INDEPENDENT
DRESSING YOURSELF: INDEPENDENT
TOILETING: INDEPENDENT
JUDGMENT_ADEQUATE_SAFELY_COMPLETE_DAILY_ACTIVITIES: YES
LACK_OF_TRANSPORTATION: PATIENT DECLINED
HEARING - LEFT EAR: FUNCTIONAL
ADEQUATE_TO_COMPLETE_ADL: YES
WALKS IN HOME: INDEPENDENT

## 2024-08-13 ASSESSMENT — PAIN - FUNCTIONAL ASSESSMENT
PAIN_FUNCTIONAL_ASSESSMENT: 0-10

## 2024-08-13 ASSESSMENT — LIFESTYLE VARIABLES
HOW OFTEN DO YOU HAVE 6 OR MORE DRINKS ON ONE OCCASION: NEVER
AUDIT-C TOTAL SCORE: 0
SKIP TO QUESTIONS 9-10: 1
HOW MANY STANDARD DRINKS CONTAINING ALCOHOL DO YOU HAVE ON A TYPICAL DAY: PATIENT DOES NOT DRINK
HOW OFTEN DO YOU HAVE A DRINK CONTAINING ALCOHOL: NEVER
AUDIT-C TOTAL SCORE: 0

## 2024-08-13 ASSESSMENT — PATIENT HEALTH QUESTIONNAIRE - PHQ9
1. LITTLE INTEREST OR PLEASURE IN DOING THINGS: NOT AT ALL
SUM OF ALL RESPONSES TO PHQ9 QUESTIONS 1 & 2: 0
2. FEELING DOWN, DEPRESSED OR HOPELESS: NOT AT ALL

## 2024-08-13 ASSESSMENT — PAIN DESCRIPTION - LOCATION: LOCATION: HEAD

## 2024-08-13 NOTE — PROGRESS NOTES
"   Medical Group Progress Note  ASSESSMENT & PLAN:     #.  Left-sided numbness/weakness  -CT head negative  -  Awaiting MRI  Brain  -I symptoms currently isolated to left upper extremity.  Patient also complaining of left shoulder pain as well.  Reports that she has been having the symptoms for at least the past 3 weeks.  Unclear of the etiology.  -Continue home aspirin and statin     #.  Hypothyroidism  #.  HLD  -Continue home medications    Total time >35 minutes; > 50% spent counseling/coordinating care    -----This note was prepared using Dragon Medical voice recognition software. As a result, errors may occur. When identified, these errors have been corrected. While every attempt is made to correct errors during dictation, errors may still exist.------    Kevin Rodriges MD    SUBJECTIVE       Patient seen and assessed.  Patient in no acute distress.  Still endorsing some weakness and numbness in the left lower extremity as well as left shoulder pain.    OBJECTIVE:     Last Recorded Vitals:  Vitals:    08/12/24 1949 08/12/24 2241 08/13/24 0057 08/13/24 0755   BP: 145/57 (!) 181/77 177/77 147/69   Pulse: 83 80 61 74   Resp: 18 17     Temp:   35.4 °C (95.7 °F) 36.1 °C (97 °F)   TempSrc:       SpO2: 97% 98% 97% 96%   Weight:   78.7 kg (173 lb 8 oz)    Height:   1.397 m (4' 7\")      Last I/O:  No intake/output data recorded.    Physical Exam  Constitutional:       Appearance: Normal appearance.   HENT:      Head: Normocephalic.   Eyes:      Extraocular Movements: Extraocular movements intact.      Pupils: Pupils are equal, round, and reactive to light.   Cardiovascular:      Rate and Rhythm: Normal rate and regular rhythm.      Pulses: Normal pulses.      Heart sounds: Normal heart sounds.   Pulmonary:      Effort: Pulmonary effort is normal.      Breath sounds: Normal breath sounds.   Abdominal:      General: Bowel sounds are normal. There is no distension.      Palpations: Abdomen is soft.   Musculoskeletal:    "      General: No swelling. Normal range of motion.      Cervical back: Normal range of motion.   Skin:     General: Skin is warm and dry.   Neurological:      Mental Status: She is alert and oriented to person, place, and time.      Motor: Weakness (Left upper extremities more distal than proximal) present.   Psychiatric:         Mood and Affect: Mood normal.             Inpatient Medications:  aspirin, 81 mg, oral, Daily  atorvastatin, 10 mg, oral, Nightly  busPIRone, 5 mg, oral, Daily  enoxaparin, 40 mg, subcutaneous, q24h  isosorbide mononitrate ER, 30 mg, oral, Daily  levothyroxine, 100 mcg, oral, Daily  montelukast, 10 mg, oral, Daily  polyethylene glycol, 17 g, oral, Daily    PRN Medications  PRN medications: acetaminophen **OR** acetaminophen **OR** acetaminophen, melatonin, ondansetron **OR** ondansetron  Continuous Medications:     LABS AND IMAGING:     Labs:  Results from last 7 days   Lab Units 08/12/24  0638   WBC AUTO x10*3/uL 7.0   RBC AUTO x10*6/uL 4.63   HEMOGLOBIN g/dL 14.0   HEMATOCRIT % 42.0   MCV fL 91   MCH pg 30.2   MCHC g/dL 33.3   RDW % 12.8   PLATELETS AUTO x10*3/uL 243     Results from last 7 days   Lab Units 08/12/24  0638   SODIUM mmol/L 140   POTASSIUM mmol/L 4.3   CHLORIDE mmol/L 104   CO2 mmol/L 28   BUN mg/dL 13   CREATININE mg/dL 0.65   GLUCOSE mg/dL 106*   PROTEIN TOTAL g/dL 6.4   CALCIUM mg/dL 9.1   BILIRUBIN TOTAL mg/dL 0.9   ALK PHOS U/L 78   AST U/L 17   ALT U/L 29     Results from last 7 days   Lab Units 08/12/24  0638   MAGNESIUM mg/dL 1.89     Results from last 7 days   Lab Units 08/12/24  1939 08/12/24  1823   TROPHS ng/L 10 8     Imaging:  ECG 12 lead  Sinus rhythm with occasional Premature ventricular complexes  Otherwise normal ECG  When compared with ECG of 08-JAN-2019 07:21,  Premature ventricular complexes are now Present  Vent. rate has increased BY  34 BPM  CT head wo IV contrast  Narrative: Interpreted By:  Buck Lee,   STUDY:  CT HEAD WO IV CONTRAST;   8/12/2024 6:27 pm      INDICATION:  Signs/Symptoms:left sided weakness.      COMPARISON:  None.      ACCESSION NUMBER(S):  [id number]      ORDERING CLINICIAN:  GINA MCGILL      TECHNIQUE:  Noncontrast axial CT scan of head was performed. Multiplanar  reconstructions      FINDINGS:  No acute intracranial hemorrhage, mass effect, midline shift, or  herniation. No evidence of hydrocephalus. The ventricles and sulci  are unremarkable for age. The visualized paranasal sinuses and  mastoid air cells are clear. No acute osseous abnormality of the  calvarium. No destructive bone lesion.      Impression: No acute intracranial abnormality. Consider follow-up with MRI as  warranted.          Signed by: Buck Lee 8/12/2024 6:58 PM  Dictation workstation:   [id number]  CT angio chest for pulmonary embolism  Narrative: Interpreted By:  Buck Lee,   STUDY:  CT ANGIO CHEST FOR PULMONARY EMBOLISM;  8/12/2024 6:27 pm      INDICATION:  Signs/Symptoms:rule out PE.      COMPARISON:  None.      ACCESSION NUMBER(S):  [id number]      ORDERING CLINICIAN:  GINA MCGILL      TECHNIQUE:  Helical data acquisition of the chest was obtained with IV contrast  material. 75 mL of Omnipaque 350. MIP reconstructions. Images were  reformatted in axial, coronal, and sagittal planes.      FINDINGS:  Lungs and Pleura: Minimal scattered areas of subsegmental  atelectasis. No pulmonary consolidation. No pleural effusion. No  pneumothorax.      Mediastinum and axilla: No evidence of pulmonary embolism. No  adenopathy by CT size criteria. No cardiomegaly or pericardial  effusion. No thoracic aortic aneurysm.      Visualized Upper Abdomen: No worrisome findings in the visualized  upper abdomen.      MSK/Chest Wall: No aggressive bony lesion identified. Scattered  degenerative changes in the lower thoracic spine.      Impression: No evidence of pulmonary embolism.      Signed by: Buck Lee 8/12/2024 6:55 PM  Dictation workstation:    [id number]

## 2024-08-13 NOTE — PROGRESS NOTES
08/13/24 1232   Discharge Planning   Living Arrangements Spouse/significant other   Support Systems Spouse/significant other   Assistance Needed none   Type of Residence Private residence   Expected Discharge Disposition Home   Does the patient need discharge transport arranged? No     Chart reviewed, writer spoke with patient- introduced self and explained role. Patient is alert and oriented x3. She is sitting up in bed. She interacted well with writer. Patient lives with spouse and prior level of functioning independent. She continues to drive and drove herself to the ED. Writer discussed discharge needs/ concerns. She is not anticipating any needs/ plan is home. PCP Dr. Porter who she saw about 3 months ago. Care Transition team to follow and assist as needed. YOSVANY Dale

## 2024-08-13 NOTE — H&P
Medical Group History and Physical      ASSESSMENT & PLAN:     #.  Left-sided numbness/weakness  -CT head negative  -Will obtain MRI brain  -If MRI negative and symptoms remain, consider neurology consult  -Continue home aspirin and statin    #.  Hypothyroidism  #.  HLD  -Continue home medications    VTE PPX: Lovenox      Ben Mehta MD    --Of note, this documentation is completed using the Dragon Dictation system (voice recognition software). There may be spelling and/or grammatical errors that were not corrected prior to final submission.--    HISTORY OF PRESENT ILLNESS:   Chief Complaint: Left-sided weakness/numbness    Scotty Vazquez is a 59 y.o. female presenting with left-sided numbness and weakness.  Patient states that her symptoms started a few weeks ago.  She has reduced sensation in her left arm and left leg and states that it feels weak.  Denies any additional areas of weakness or numbness.  She also denies any dysarthria, dysphagia, facial droop, visual changes, headache.    ER Course: Vital signs stable.  Labs all essentially within normal limits.  CT head negative for acute intracranial process.  CTPA negative for PE.       ROS  10 point review of systems negative except per HPI     PAST HISTORIES:     Past Medical History  She has a past medical history of Acute frontal sinusitis, unspecified, Acute laryngitis, Other conditions influencing health status, Other specified abnormal findings of blood chemistry, Personal history of nicotine dependence, Personal history of other diseases of the circulatory system, Personal history of other diseases of the digestive system, Personal history of other diseases of the digestive system, Personal history of other diseases of the musculoskeletal system and connective tissue, Personal history of other diseases of the respiratory system, Personal history of other specified conditions, Personal history of other specified conditions, Personal history of  other specified conditions, Personal history of pneumonia (recurrent), Right lower quadrant pain, Right upper quadrant pain, and Unilateral primary osteoarthritis, right hip (09/14/2021).    Surgical History  She has a past surgical history that includes Other surgical history (09/02/2022); Other surgical history (09/02/2022); and Other surgical history (12/08/2022).     Social History  She reports that she quit smoking about 13 years ago. Her smoking use included cigarettes. She has never used smokeless tobacco. She reports that she does not currently use alcohol. She reports that she does not use drugs.    Family History  Family History   Problem Relation Name Age of Onset    COPD Mother      Heart failure Father      Coronary artery disease Father      Hypertension Father      Heart attack Father      Asthma Sister      Thyroid disease Sister         Allergies:  Codeine and Penicillins      OBJECTIVE:      Last Recorded Vitals  /77   Pulse 61   Temp 35.4 °C (95.7 °F)   Resp 17   Wt 78.7 kg (173 lb 8 oz)   SpO2 97%     Last I/O:  No intake/output data recorded.    Physical Exam   Gen: NAD, appears stated age  HEENT: EOM, MMM  CV: RRR, no murmurs rubs or gallops  Resp: Clear to auscultation bilaterally, normal effort  Abdomen: soft, NT,+BS  LE: No edema, no deformity  Neuro: A&Ox4, 4/5 strength in LUE and LLE, reduced sensation on left side compared to right, CN II to XII intact without deficits, finger-nose normal bilaterally    LABS AND IMAGING:       Relevant Results  Labs Reviewed   URINALYSIS WITH REFLEX CULTURE AND MICROSCOPIC - Abnormal       Result Value    Color, Urine Colorless (*)     Appearance, Urine Clear      Specific Gravity, Urine 1.036 (*)     pH, Urine 7.0      Protein, Urine NEGATIVE      Glucose, Urine Normal      Blood, Urine NEGATIVE      Ketones, Urine NEGATIVE      Bilirubin, Urine NEGATIVE      Urobilinogen, Urine Normal      Nitrite, Urine NEGATIVE      Leukocyte Esterase,  Urine NEGATIVE     LACTATE - Normal    Lactate 1.5      Narrative:     Venipuncture immediately after or during the administration of Metamizole may lead to falsely low results. Testing should be performed immediately  prior to Metamizole dosing.   B-TYPE NATRIURETIC PEPTIDE - Normal    BNP 48      Narrative:        <100 pg/mL - Heart failure unlikely  100-299 pg/mL - Intermediate probability of acute heart                  failure exacerbation. Correlate with clinical                  context and patient history.    >=300 pg/mL - Heart Failure likely. Correlate with clinical                  context and patient history.    BNP testing is performed using different testing methodology at [facility]] than at other [facility]. Direct result comparisons should only be made within the same method.      D-DIMER, VTE EXCLUSION - Normal    D-Dimer, Quantitative VTE Exclusion 225      Narrative:     The VTE Exclusion D-Dimer assay is reported in ng/mL Fibrinogen Equivalent Units (FEU).    Per 's instructions for use, a value of less than 500 ng/mL (FEU) may help to exclude DVT or PE in outpatients when the assay is used with a clinical pretest probability assessment.(AE must utilize and document eCalc 'Wells Score Deep Vein Thrombosis Risk' for DVT exclusion only. Emergency Department should utilize  Guidelines for Emergency Department Use of the VTE Exclusion D-Dimer and Clinical Pretest probability assessment model for DVT or PE exclusion.)   PROTIME-INR - Normal    Protime 12.3      INR 1.1     APTT - Normal    aPTT 33      Narrative:     The APTT is no longer used for monitoring Unfractionated Heparin Therapy. For monitoring Heparin Therapy, use the Heparin Assay.   SERIAL TROPONIN-INITIAL - Normal    Troponin I, High Sensitivity 8      Narrative:     Less than 99th percentile of normal range cutoff-  Female and children under 18 years old <14 ng/L; Male <21 ng/L: Negative  Repeat  testing should be performed if clinically indicated.     Female and children under 18 years old 14-50 ng/L; Male 21-50 ng/L:  Consistent with possible cardiac damage and possible increased clinical   risk. Serial measurements may help to assess extent of myocardial damage.     >50 ng/L: Consistent with cardiac damage, increased clinical risk and  myocardial infarction. Serial measurements may help assess extent of   myocardial damage.      NOTE: Children less than 1 year old may have higher baseline troponin   levels and results should be interpreted in conjunction with the overall   clinical context.     NOTE: Troponin I testing is performed using a different   testing methodology at [facility] than at other   [facility]. Direct result comparisons should only   be made within the same method.   SERIAL TROPONIN, 1 HOUR - Normal    Troponin I, High Sensitivity 10      Narrative:     Less than 99th percentile of normal range cutoff-  Female and children under 18 years old <14 ng/L; Male <21 ng/L: Negative  Repeat testing should be performed if clinically indicated.     Female and children under 18 years old 14-50 ng/L; Male 21-50 ng/L:  Consistent with possible cardiac damage and possible increased clinical   risk. Serial measurements may help to assess extent of myocardial damage.     >50 ng/L: Consistent with cardiac damage, increased clinical risk and  myocardial infarction. Serial measurements may help assess extent of   myocardial damage.      NOTE: Children less than 1 year old may have higher baseline troponin   levels and results should be interpreted in conjunction with the overall   clinical context.     NOTE: Troponin I testing is performed using a different   testing methodology at [facility] than at other   [facility]. Direct result comparisons should only   be made within the same method.   TROPONIN SERIES- (INITIAL, 1 HR)    Narrative:     The following orders  were created for panel order Troponin I Series, High Sensitivity (0, 1 HR).  Procedure                               Abnormality         Status                     ---------                               -----------         ------                     Troponin I, High Sensiti...[[id number]]  Normal              Final result               Troponin, High Sensitivi...[[id number]]  Normal              Final result                 Please view results for these tests on the individual orders.   URINALYSIS WITH REFLEX CULTURE AND MICROSCOPIC    Narrative:     The following orders were created for panel order Urinalysis with Reflex Culture and Microscopic.  Procedure                               Abnormality         Status                     ---------                               -----------         ------                     Urinalysis with Reflex C...[[id number]]  Abnormal            Final result               Extra Urine Gray Tube[[id number]]                            In process                   Please view results for these tests on the individual orders.   EXTRA URINE GRAY TUBE     CT angio chest for pulmonary embolism   Final Result   No evidence of pulmonary embolism.        Signed by: Buck Lee 8/12/2024 6:55 PM   Dictation workstation:   [id number]      CT head wo IV contrast   Final Result   No acute intracranial abnormality. Consider follow-up with MRI as   warranted.             Signed by: Buck Lee 8/12/2024 6:58 PM   Dictation workstation:   [id number]      MR brain wo IV contrast    (Results Pending)

## 2024-08-14 ENCOUNTER — TELEPHONE (OUTPATIENT)
Dept: PHYSICAL THERAPY | Facility: CLINIC | Age: 59
End: 2024-08-14
Payer: COMMERCIAL

## 2024-08-14 VITALS
HEART RATE: 78 BPM | SYSTOLIC BLOOD PRESSURE: 131 MMHG | TEMPERATURE: 97.2 F | HEIGHT: 55 IN | WEIGHT: 173.5 LBS | BODY MASS INDEX: 40.15 KG/M2 | OXYGEN SATURATION: 93 % | RESPIRATION RATE: 14 BRPM | DIASTOLIC BLOOD PRESSURE: 58 MMHG

## 2024-08-14 PROCEDURE — 2500000001 HC RX 250 WO HCPCS SELF ADMINISTERED DRUGS (ALT 637 FOR MEDICARE OP): Performed by: STUDENT IN AN ORGANIZED HEALTH CARE EDUCATION/TRAINING PROGRAM

## 2024-08-14 PROCEDURE — 99239 HOSP IP/OBS DSCHRG MGMT >30: CPT | Performed by: STUDENT IN AN ORGANIZED HEALTH CARE EDUCATION/TRAINING PROGRAM

## 2024-08-14 PROCEDURE — G0378 HOSPITAL OBSERVATION PER HR: HCPCS

## 2024-08-14 RX ORDER — DICLOFENAC SODIUM 10 MG/G
4 GEL TOPICAL 4 TIMES DAILY PRN
Qty: 450 G | Refills: 1 | Status: SHIPPED | OUTPATIENT
Start: 2024-08-14

## 2024-08-14 ASSESSMENT — PAIN SCALES - GENERAL: PAINLEVEL_OUTOF10: 0 - NO PAIN

## 2024-08-14 NOTE — PROGRESS NOTES
Occupational Therapy                 Therapy Communication Note    Patient Name: Scotty Vazquez  MRN: [id number]  Today's Date: 8/14/2024     Discipline: Occupational Therapy    Missed Visit Reason:  Met with pt, reports L UE symptoms have improved but pt con't to have pain in L shoulder and diminished sensastion L forearm/hand. Pt demo full AROM L UE, reports indep with ADLS, including FMC, opening containers on meal tray. Observed pt amb in room indep without assistive device, good dyn std balance. No additional OT indicated at this time.

## 2024-08-14 NOTE — PROGRESS NOTES
Physical Therapy                 Therapy Communication Note    Patient Name: Scotty Vazquez  MRN: [id number]  Today's Date: 8/14/2024 924    Discipline: Physical Therapy    Missed Visit Reason: Missed Visit Reason:  (Observed patient up moving in room independently Spoke with Carolina ERES and she supports that patient has been moving around room independently without concerns. No PT needs demonstrated at this time. Orders to be discontinued)

## 2024-08-14 NOTE — CARE PLAN
Problem: Fall/Injury  Goal: Not fall by end of shift  Outcome: Progressing  Goal: Be free from injury by end of the shift  Outcome: Progressing  Goal: Verbalize understanding of personal risk factors for fall in the hospital  Outcome: Progressing  Goal: Verbalize understanding of risk factor reduction measures to prevent injury from fall in the home  Outcome: Progressing  Goal: Use assistive devices by end of the shift  Outcome: Progressing  Goal: Pace activities to prevent fatigue by end of the shift  Outcome: Progressing   The patient's goals for the shift include      The clinical goals for the shift include patient will be free of falls

## 2024-08-14 NOTE — TELEPHONE ENCOUNTER
Returned the call for Physical therapy eval. [facility] for patient to call back to schedule

## 2024-08-14 NOTE — DISCHARGE SUMMARY
Medical Group Discharge Summary  DISCHARGE DIAGNOSIS     1. Acute left-sided weakness    2. Abnormal EKG    3. Acute pain of right shoulder          HOSPITAL COURSE AND DETAILS      59-year-old woman coming in with left-sided weakness and numbness that started a few weeks ago.  CT head negative for any acute intracranial process, CT angio negative for PE.  Given patient's persistent left-sided numbness or weakness MRI brain was done which did not show any new or concerning intracranial.  Weakness and numbness became more isolated to left upper extremity and  patient reports shoulder pain.  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    patient was discharged home with plan for outpatient PT and a follow-up with orthopedics for evaluation of her shoulder given significant concern for rotator cuff injury given physical exam.  Also recommended that patient have light duty at work until there is improvement in her shoulder pain as well as left upper extremity deficits.     > 30 minutes was spent on discharge services.    -----This note was prepared using Dragon Medical voice recognition software. As a result, errors may occur. When identified, these errors have been corrected. While every attempt is made to correct errors during dictation, errors may still exist.------    Kevin Rodriges MD    DISCHARGE PHYSICAL EXAM     Last Recorded Vitals:  Vitals:    08/13/24 1531 08/13/24 1919 08/13/24 2334 08/14/24 0747   BP: 121/58 128/63 118/61 131/58   BP Location:    Right arm   Patient Position: Sitting   Lying   Pulse: 67 70 63 78   Resp: 16   14   Temp: 36.5 °C (97.7 °F) 36.3 °C (97.3 °F) 36.5 °C (97.7 °F) 36.2 °C (97.2 °F)   TempSrc:    Temporal   SpO2: 93% 95% 95% 93%   Weight:       Height:           Physical Exam    General: Well appearing, well nourished, in no distress. Oriented x 3, normal mood and  DR Barton please see mri report   affect . Ambulating without difficulty.  HEENT: Normocephalic, atraumatic,conjunctiva clear, sclera non-icteric, EOM intact, PERRL, mucosa non-inflamed  Neck: Supple, without lesions, bruits, or adenopathy, thyroid non-enlarged and non-tender  Heart: No cardiomegaly or thrills; regular rate and rhythm, no murmur or gallop  Lungs: Clear to auscultation and percussion,  no rhonchi rales or wheezing  Abdomen: Bowel sounds normal, no tenderness,  no guarding or rebound  tenderness.  No organomegaly, masses, or hernia  Back: Spine normal without deformity or tenderness, no CVA tenderness  Extremities: No amputations or deformities, cyanosis, edema or varicosities, peripheral pulses intact  Musculoskeletal:  left upper extremity with significant pain and worsening numbness and paresthesias with trying to lift arm beyond vertical. Otherwise no misalignment, asymmetry, crepitation, defects, tenderness, masses, effusions, decreased range of motion, instability, atrophy or abnormal  strength or tone in the head, neck, spine, ribs, pelvis or extremities.  Neurologic: CN 2-12 normal. Sensation to pain, touch, and proprioception normal.  Decrease strength in left upper extremity more distal than proximal  Psychiatric: Oriented X3, intact recent and remote memory, judgment and insight, normal mood and affect.        DISCHARGE MEDICATIONS        Your medication list        START taking these medications        Instructions Last Dose Given Next Dose Due   diclofenac sodium 1 % gel  Commonly known as: Voltaren      Apply 4.5 inches (4 g) topically 4 times a day as needed (shoulder pain).              CONTINUE taking these medications        Instructions Last Dose Given Next Dose Due   acetaminophen 500 mg tablet  Commonly known as: Tylenol           albuterol 90 mcg/actuation inhaler  Commonly known as: ProAir HFA      Inhale 1-2 puffs every 4 hours if needed for wheezing or shortness of breath.       aspirin 81 mg chewable tablet            atorvastatin 20 mg tablet  Commonly known as: Lipitor      TAKE ONE-HALF TABLET AT BEDTIME       busPIRone 5 mg tablet  Commonly known as: Buspar           cetirizine 10 mg tablet  Commonly known as: ZyrTEC           coenzyme Q-10 100 mg capsule           icosapent ethyL 1 gram capsule  Commonly known as: Vascepa      TAKE 2 CAPSULES BY MOUTH TWICE A DAY       isosorbide mononitrate ER 30 mg 24 hr tablet  Commonly known as: Imdur           levothyroxine 100 mcg tablet  Commonly known as: Synthroid, Levoxyl      PLEASE SEE ATTACHED FOR DETAILED DIRECTIONS       montelukast 10 mg tablet  Commonly known as: Singulair      TAKE 1 TABLET DAILY       nitroglycerin 0.4 mg SL tablet  Commonly known as: Nitrostat      Place 1 tablet (0.4 mg) under the tongue every 5 minutes if needed for chest pain.       polyethylene glycol 17 gram/dose powder  Commonly known as: Glycolax, Miralax                  STOP taking these medications      cyclobenzaprine 10 mg tablet  Commonly known as: Flexeril        docusate sodium 100 mg capsule  Commonly known as: Colace        doxycycline 100 mg capsule  Commonly known as: Monodox        Nasacort 55 mcg nasal inhaler  Generic drug: triamcinolone                  Where to Get Your Medications        These medications were sent to [facility]/pharmacy #[id number] [facility] [place], [place] - [address] [place] [address] AT [place] [id number]      Phone: [phone]   diclofenac sodium 1 % gel           OUTPATIENT FOLLOW-UP     Future Appointments   Date Time Provider Department Center   8/27/2024  9:20 AM Nicolas Porter DO [id number] [place]   12/11/2024  3:00 PM Ezequiel Linn DO [id number] [place]

## 2024-08-14 NOTE — PROGRESS NOTES
Pd today. Pt given physical tx locations, prefers [facility]. Dr figueroa agreed to  to put in RX in [facility]. Pt requires light duty note for work. Dr figueroa ad  Joanne solomon are aware. Pt will schedule outpt tx for left shoulder and plans to see a ortho at [facility]. 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

## 2024-08-14 NOTE — PROGRESS NOTES
08/14/24 1247   Current Planned Discharge Disposition   Current Planned Discharge Disposition Home  (outpt physical tx)     Pt given  rehab locations and will self schedule. Works night shift. Rx in epic.

## 2024-08-15 ENCOUNTER — PATIENT OUTREACH (OUTPATIENT)
Dept: CARE COORDINATION | Facility: CLINIC | Age: 59
End: 2024-08-15

## 2024-08-15 ENCOUNTER — OFFICE VISIT (OUTPATIENT)
Dept: ORTHOPEDIC SURGERY | Facility: CLINIC | Age: 59
End: 2024-08-15
Payer: COMMERCIAL

## 2024-08-15 ENCOUNTER — HOSPITAL ENCOUNTER (OUTPATIENT)
Dept: RADIOLOGY | Facility: CLINIC | Age: 59
Discharge: HOME | End: 2024-08-15
Payer: COMMERCIAL

## 2024-08-15 ENCOUNTER — APPOINTMENT (OUTPATIENT)
Dept: CARDIOLOGY | Facility: CLINIC | Age: 59
End: 2024-08-15
Payer: COMMERCIAL

## 2024-08-15 VITALS — BODY MASS INDEX: 35.68 KG/M2 | HEIGHT: 58 IN | WEIGHT: 170 LBS

## 2024-08-15 DIAGNOSIS — M54.2 NECK PAIN: ICD-10-CM

## 2024-08-15 DIAGNOSIS — M25.512 ACUTE PAIN OF LEFT SHOULDER: Primary | ICD-10-CM

## 2024-08-15 DIAGNOSIS — M77.8 TENDINITIS OF LEFT SHOULDER: ICD-10-CM

## 2024-08-15 DIAGNOSIS — M25.512 ACUTE PAIN OF LEFT SHOULDER: ICD-10-CM

## 2024-08-15 DIAGNOSIS — M54.12 CERVICAL RADICULOPATHY: ICD-10-CM

## 2024-08-15 PROCEDURE — 99214 OFFICE O/P EST MOD 30 MIN: CPT | Performed by: STUDENT IN AN ORGANIZED HEALTH CARE EDUCATION/TRAINING PROGRAM

## 2024-08-15 PROCEDURE — 72040 X-RAY EXAM NECK SPINE 2-3 VW: CPT | Performed by: STUDENT IN AN ORGANIZED HEALTH CARE EDUCATION/TRAINING PROGRAM

## 2024-08-15 PROCEDURE — 3008F BODY MASS INDEX DOCD: CPT | Performed by: STUDENT IN AN ORGANIZED HEALTH CARE EDUCATION/TRAINING PROGRAM

## 2024-08-15 PROCEDURE — 73030 X-RAY EXAM OF SHOULDER: CPT | Mod: LT

## 2024-08-15 PROCEDURE — 73030 X-RAY EXAM OF SHOULDER: CPT | Mod: LEFT SIDE | Performed by: STUDENT IN AN ORGANIZED HEALTH CARE EDUCATION/TRAINING PROGRAM

## 2024-08-15 PROCEDURE — 72040 X-RAY EXAM NECK SPINE 2-3 VW: CPT

## 2024-08-15 PROCEDURE — 1036F TOBACCO NON-USER: CPT | Performed by: STUDENT IN AN ORGANIZED HEALTH CARE EDUCATION/TRAINING PROGRAM

## 2024-08-15 RX ORDER — CYCLOBENZAPRINE HCL 5 MG
5 TABLET ORAL NIGHTLY
Qty: 7 TABLET | Refills: 0 | Status: SHIPPED | OUTPATIENT
Start: 2024-08-15 | End: 2024-08-22

## 2024-08-15 RX ORDER — METHYLPREDNISOLONE 4 MG/1
TABLET ORAL
Qty: 1 EACH | Refills: 0 | Status: SHIPPED | OUTPATIENT
Start: 2024-08-15

## 2024-08-15 NOTE — LETTER
August 15, 2024     Patient: Scotty Vazquez   YOB: 1965   Date of Visit: 8/15/2024       To Whom It May Concern:    It is my medical opinion that Scotty Vazquez should remain out of work until 8/19/2024. When she returns she will need the following restrictions no lifting, bending, pulling or pushing greater than 25 lbs due to patient having a left shoulder injury until follow up appointment.    If you have any questions or concerns, please don't hesitate to call.         Sincerely,        Montez Johnson DO    CC: No Recipients

## 2024-08-15 NOTE — PROGRESS NOTES
Discharge Facility: [facility]   Discharge Diagnosis: Acute left-sided weakness  Abnormal EKG  Acute pain of right shoulder  Admission Date: 8/12/24  Discharge Date: 8/14/24    PCP Appointment Date: Nicolas Porter DO 8/27/24  Specialist Appointment Date: Ortho Surg Dr Johnson 8/15/24  Hospital Encounter and Summary Linked: Yes  See discharge assessment below for further details     Engagement  Call Start Time: 1456 (8/15/2024  2:56 PM)    Medications  Medications reviewed with patient/caregiver?: Yes (8/15/2024  2:56 PM)  Is the patient having any side effects they believe may be caused by any medication additions or changes?: No (8/15/2024  2:56 PM)  Does the patient have all medications ordered at discharge?: Yes (8/15/2024  2:56 PM)  Care Management Interventions: No intervention needed (8/15/2024  2:56 PM)  Prescription Comments: STOP taking: cyclobenzaprine 10 mg tablet (Flexeril) docusate sodium 100 mg capsule (Colace) doxycycline 100 mg capsule (Monodox) Nasacort 55 mcg nasal inhaler (triamcinolone) (8/15/2024  2:56 PM)  Is the patient taking all medications as directed (includes completed medication regime)?: Yes (8/15/2024  2:56 PM)  Care Management Interventions: Provided patient education (8/15/2024  2:56 PM)  Medication Comments: diclofenac sodium (Voltaren) (8/15/2024  2:56 PM)    Appointments  Does the patient have a primary care provider?: Yes (8/15/2024  2:56 PM)  Care Management Interventions: Verified appointment date/time/provider (Nicolas Porter DO 8/27/24) (8/15/2024  2:56 PM)  Has the patient kept scheduled appointments due by today?: Yes (8/15/2024  2:56 PM)  Care Management Interventions: Advised patient to keep appointment (8/15/2024  2:56 PM)    Self Management  What is the home health agency?: denies need (8/15/2024  2:56 PM)  What Durable Medical Equipment (DME) was ordered?: denies need (8/15/2024  2:56 PM)    Patient Teaching  Does the patient have access to their  discharge instructions?: Yes (8/15/2024  2:56 PM)  Care Management Interventions: Reviewed instructions with patient; Educated on MyChart (8/15/2024  2:56 PM)  What is the patient's perception of their health status since discharge?: Same (8/15/2024  2:56 PM)  Is the patient/caregiver able to teach back the hierarchy of who to call/visit for symptoms/problems? PCP, Specialist, Home Health nurse, Urgent Care, ED, 911: Yes (8/15/2024  2:56 PM)  Patient/Caregiver Education Comments: CM discussed referencing discharge paperwork to follow detailed daily medication schedule (8/15/2024  2:56 PM)    Wrap Up  Wrap Up Additional Comments: This CM spoke with patient via phone. Successful transition of care outreach complete. Pt reports doing well at home since discharge. New meds reviewed. Pt denies CP and SOB. Patient denies any further discharge questions/needs at this time. Emphasized that Follow up is needed after discharge to review the hospital recommendations, assess your response to your treatment.  Pt aware of my availability for non-emergent concerns. Contact info provided to patient. (8/15/2024  2:56 PM)

## 2024-08-15 NOTE — PROGRESS NOTES
Acute Injury Established Patient Visit    HPI: Scotty is a 59 y.o.female who presents today with new complaints of left shoulder injury.  She has been having pain anteriorly and numbness that radiates down to the fingers.  It affects all of her fingers.  She went to the emergency department for this pain, they thought she may be having a stroke.  She denies any falls or injuries.  This has been going on for about 3 weeks.  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  She has been off since Monday.  She denies any popping, clicking, and locking.    Plan: For this left cervical radiculopathy and left rotator cuff tendinitis, we will start her on physical therapy, Medrol Dosepak, cyclobenzaprine, and other conservative treatment measures as discussed.  Will follow-up in 4 to 5 weeks.  We gave her a work note.    Assessment:   Problem List Items Addressed This Visit    None  Visit Diagnoses       Acute pain of left shoulder    -  Primary    Relevant Orders    XR shoulder left 2+ views    Referral to Physical Therapy    Neck pain        Relevant Orders    XR cervical spine 2-3 views    Referral to Physical Therapy    Tendinitis of left shoulder        Relevant Orders    Referral to Physical Therapy    Cervical radiculopathy        Relevant Orders    Referral to Physical Therapy            Diagnostics: Reviewed      Procedure:  Procedures    Physical Exam:  GENERAL:  No obvious acute distress.  NEURO:  Distally neurovascularly intact.  Sensation intact to light touch.  Extremity: Left shoulder exam shows:  Skin is intact;  No erythema or warmth;  No edema or ecchymosis;  No clinical signs of infection;  Can forward flex to 180 degrees, but with pain at terminal range of motion;  Abduction to 180 degrees, but with pain at terminal range of motion;  External rotation from neutral at 75 degrees;  Internal rotation at the level of T10;  POSITIVE signs of impingement  with Francisco or Neer's test;  Negative empty can test;  Negative crossarm test;  No pain over the AC joint;  Negative Lufkin's test;  Negative sulcus sign;  Negative anterior apprehension's test; and  Neurovascularly intact.    Exam of the cervical spine:  No tenderness along the midline of the cervical spine;  No step-offs along the midline of the cervical spine;  Flexion is full limited with pain;  Extension is limited with pain;  Sidebending is full and symmetric, but with pain;  Rotation is full and symmetric, but with pain;  POSITIVE Spurling's on the right;  Negative Spurling's on the left;  Full strength and range of motion throughout the bilateral upper extremities; and  Nonantalgic gait.    Orders Placed This Encounter    XR cervical spine 2-3 views    XR shoulder left 2+ views    Referral to Physical Therapy      At the conclusion of the visit there were no further questions by the patient/family regarding their plan of care.  Patient was instructed to call or return with any issues, questions, or concerns regarding their injury and/or treatment plan described above.     08/15/24 at 9:03 AM - Montez Johnson,     Office: [phone]    This note was prepared using voice recognition software.  The details of this note are correct and have been reviewed, and corrected to the best of my ability.  Some grammatical errors may persist related to the Dragon software.

## 2024-08-15 NOTE — LETTER
August 15, 2024     Patient: Scotty Vazquez   YOB: 1965   Date of Visit: 8/15/2024       To Whom It May Concern:    Scotty Vazquez was seen in my clinic on 8/15/2024 at 8:00 am. Please excuse Scotty for her absence from work on this day to make the appointment.  Please excuse Scotty from 8/12/24 through 8/19/24.    If you have any questions or concerns, please don't hesitate to call.         Sincerely,         Montez Johnson,         CC: No Recipients

## 2024-08-20 ENCOUNTER — EVALUATION (OUTPATIENT)
Dept: PHYSICAL THERAPY | Facility: CLINIC | Age: 59
End: 2024-08-20
Payer: COMMERCIAL

## 2024-08-20 DIAGNOSIS — M77.8 TENDINITIS OF LEFT SHOULDER: ICD-10-CM

## 2024-08-20 DIAGNOSIS — M25.512 ACUTE PAIN OF LEFT SHOULDER: ICD-10-CM

## 2024-08-20 DIAGNOSIS — M54.12 CERVICAL RADICULOPATHY: ICD-10-CM

## 2024-08-20 DIAGNOSIS — M54.2 NECK PAIN: ICD-10-CM

## 2024-08-20 PROCEDURE — 97161 PT EVAL LOW COMPLEX 20 MIN: CPT | Mod: GP | Performed by: PHYSICAL THERAPIST

## 2024-08-20 PROCEDURE — 97110 THERAPEUTIC EXERCISES: CPT | Mod: GP | Performed by: PHYSICAL THERAPIST

## 2024-08-20 ASSESSMENT — PATIENT HEALTH QUESTIONNAIRE - PHQ9
2. FEELING DOWN, DEPRESSED OR HOPELESS: NOT AT ALL
1. LITTLE INTEREST OR PLEASURE IN DOING THINGS: SEVERAL DAYS
10. IF YOU CHECKED OFF ANY PROBLEMS, HOW DIFFICULT HAVE THESE PROBLEMS MADE IT FOR YOU TO DO YOUR WORK, TAKE CARE OF THINGS AT HOME, OR GET ALONG WITH OTHER PEOPLE: SOMEWHAT DIFFICULT
SUM OF ALL RESPONSES TO PHQ9 QUESTIONS 1 AND 2: 1

## 2024-08-20 NOTE — PROGRESS NOTES
Physical Therapy Evaluation and Treatment      Patient Name: Scotty Vazquez  MRN: [id number]  Today's Date: 8/20/2024  Time Calculation  Start Time: 1023  Stop Time: 1108  Time Calculation (min): 45 min      PT Evaluation Time Entry  PT Evaluation (Low) Time Entry: 25  PT Therapeutic Procedures Time Entry  Therapeutic Exercise Time Entry: 15                   INSURANCE:  Visit number: 1/8  ANTHEM 90V PT OT COPAY 0 DED [phone](864)  COVERAGE 80 OOP [phone]) 59514(983) NO AUTH REQ  REF# CAITIE W I- [id number]/ALL     Referring Provider: Montez Johnson DO  Hx: Heart Disease, Asthma, Hypothyroidism     ASSESSMENT:  PT Assessment Results: decreased knowledge of HEP, activity limitations, ADLs/IADLs/self care skills, flexibility, motor function/control/tone, pain, participation restrictions, range of motion/joint mobility, strength, posture, transfers, coordination.  Rehab Prognosis: Good  Evaluation/Treatment Tolerance: Patient tolerated treatment well    Scotty Vazquez is a 59 y.o. female presenting to the clinic with cervical radiculopathy affecting the L UE/shoulder. Pt demonstrates decreased ROM and strength of B shoulders and cervical spine causing pain and dysfunction with lifting, reaching, carrying, pushing, pulling, and transferring patients. Pt was given and reviewed HEP including postural exercises. Pt will benefit from skilled PT in order to increase ROM and strength of the B shoulders and cervical spine so that the pt can perform ADLs without pain and return to PLOF.     PLAN:  Treatments/Interventions: traction, dry needling, edema control, education/instruction, home program, self care/home management, manual therapy, neuromuscular re-education, therapeutic activities, therapeutic exercises, modalities, therapeutic elastic taping.   PT Plan: Skilled PT  PT Frequency: 2 times per week  Duration: 8 visits  Onset Date: 07/22/24  Rehab Potential: Good  Plan of Care Agreement: Patient    Goals  -    By discharge pt will achieve the following goals:   Pt will report less than a 2/10 pain at the worst.  Pt will report a significant improvement with Quick DASH score.  Pt will have at least 4+/5 strength for the bilateral shoulders.  Pt will have AROM to WFL for the cervical spine.  Pt will be independent with HEP.    CURRENT PROBLEM:   1. Neck pain  Referral to Physical Therapy    Follow Up In Physical Therapy      2. Acute pain of left shoulder  Referral to Physical Therapy    Follow Up In Physical Therapy      3. Tendinitis of left shoulder  Referral to Physical Therapy    Follow Up In Physical Therapy      4. Cervical radiculopathy  Referral to Physical Therapy    Follow Up In Physical Therapy          Subjective    General -    Pt reports that she has pain in her neck and shoulders that started at the end of the July. Pt reports that it progressively got worse until her entire L shoulder is going numb and she could barely move it. Pt reports that she thought she was having a stroke so she went to the ER on 8/12/24. Cleared for stroke and found that she had cervical radiculopathy.     Mechanism of Injury -    Insidious Onset    History of Current Episode - 7/22/24    Pain -    Pain Location: Cervical spine and L UE/Shoulder  Pain Best: 2/10  Pain Today: 6/10  Pain Worst: 9/10   Pain Type: Constant, Achy/Dull, sometimes Sharp, Stiffness/Tightness, Numbness/Tingling, Headaches    Pain Exacerbating Factors: lifting, reaching, carrying, pushing, pulling, and transferring patients.  Pain Relieving Factors: Rest, Ice/Heat compresses, Tylenol    Difficulty Sleeping: Yes  Night Sweats, Loss of Appetite, Unexpected Weight-loss: No  Saddle/Bowel/Bladder: No    Work -   On Short Term Disability right now (no light duty at her work).   [employer]. Pt is usually a full time employee.    Home Living -    Pt lives with her .    Patient Stated Goals -    100% back to full motion and strength  without pain for being able to do her job    PRECAUTIONS -    None    Prior Level of Function -    I with ADLs/IADLs    Objective     Cervical AROM (degrees) -  Cervical Flexion: 20 with pain  Cervical Extension: 12 with pain  R Cervical Side Bend: 13 with pain  L Cervical Side Bend: 15 with pain  R Cervical Rotation: 40 with slight pain  L Cervical Rotation: 32 with slight pain     Shoulder AROM (degrees) - WFL B    Shoulder MMT (/5) -   R shoulder Flexion: 4+  R shoulder Abduction: 4+   R shoulder ER: 4   R shoulder IR: 4+   L shoulder Flexion: 4- with pain  L shoulder Abduction: 4- with pain   L shoulder ER: 4   L shoulder IR: 4+     Palpation -  TTP B (L>R) UT/levator, L biceps tendon    Special Tests -  Spurling's positive right  Distraction positive    Posture -   Rounded Shoulders  Forward Head  Slight Increased Thoracic Kyphosis     Outcome Measures -   Other Measures  Disability of Arm Shoulder Hand (DASH): 64 (% Quick)     Treatment -   Evaluation -   Low (16438)  Therapeutic Exercise (53760) -     Seated Correct Posture: reviewed  Seated/Supine Cervical Retractions: x10 seated  Scapular Retractions: x10  UT stretch: 30 sec ea B  T-band B ER: RTB x10  T-band Row: RTB x10    OP Patient Education -   Access Code: [email or web address]  URL: [email or web address]  Date: 08/20/2024  Prepared by: Radha Leahy    Exercises  - Seated Correct Posture   - Seated Cervical Retraction  - 1-2 x daily - 2 sets - 10 reps - 2 seconds hold  - Supine Cervical Retraction with Towel  - 1-2 x daily - 2 sets - 10 reps - 2 seconds hold  - Seated Scapular Retraction  - 1-2 x daily - 2 sets - 10 reps - 2 seconds hold  - Seated Upper Trapezius Stretch  - 1-2 x daily - 3 sets - 30 seconds hold  - Shoulder External Rotation and Scapular Retraction with Resistance  - 1-2 x daily - 2 sets - 10 reps  - Standing Row with Anchored Resistance  - 1-2 x daily - 2 sets - 10 reps

## 2024-08-22 ENCOUNTER — APPOINTMENT (OUTPATIENT)
Dept: PRIMARY CARE | Facility: CLINIC | Age: 59
End: 2024-08-22
Payer: COMMERCIAL

## 2024-08-22 DIAGNOSIS — J45.20 MILD INTERMITTENT ASTHMA, UNSPECIFIED WHETHER COMPLICATED (HHS-HCC): Primary | ICD-10-CM

## 2024-08-27 ENCOUNTER — APPOINTMENT (OUTPATIENT)
Dept: PRIMARY CARE | Facility: CLINIC | Age: 59
End: 2024-08-27
Payer: COMMERCIAL

## 2024-08-27 VITALS
OXYGEN SATURATION: 93 % | HEIGHT: 58 IN | WEIGHT: 168 LBS | DIASTOLIC BLOOD PRESSURE: 84 MMHG | HEART RATE: 70 BPM | BODY MASS INDEX: 35.26 KG/M2 | SYSTOLIC BLOOD PRESSURE: 132 MMHG

## 2024-08-27 DIAGNOSIS — Z79.899 DRUG THERAPY: ICD-10-CM

## 2024-08-27 DIAGNOSIS — E03.9 HYPOTHYROIDISM, UNSPECIFIED TYPE: ICD-10-CM

## 2024-08-27 DIAGNOSIS — Z13.9 SCREENING FOR CONDITION: ICD-10-CM

## 2024-08-27 DIAGNOSIS — E55.9 VITAMIN D DEFICIENCY: ICD-10-CM

## 2024-08-27 DIAGNOSIS — E78.5 DYSLIPIDEMIA: ICD-10-CM

## 2024-08-27 DIAGNOSIS — Z12.31 ENCOUNTER FOR SCREENING MAMMOGRAM FOR MALIGNANT NEOPLASM OF BREAST: ICD-10-CM

## 2024-08-27 DIAGNOSIS — Z12.11 SCREEN FOR COLON CANCER: Primary | ICD-10-CM

## 2024-08-27 DIAGNOSIS — Z00.00 PREVENTATIVE HEALTH CARE: ICD-10-CM

## 2024-08-27 PROCEDURE — 99396 PREV VISIT EST AGE 40-64: CPT | Performed by: FAMILY MEDICINE

## 2024-08-27 PROCEDURE — 1036F TOBACCO NON-USER: CPT | Performed by: FAMILY MEDICINE

## 2024-08-27 PROCEDURE — 99214 OFFICE O/P EST MOD 30 MIN: CPT | Performed by: FAMILY MEDICINE

## 2024-08-27 PROCEDURE — 3008F BODY MASS INDEX DOCD: CPT | Performed by: FAMILY MEDICINE

## 2024-08-27 NOTE — PROGRESS NOTES
Subjective   Patient ID: Scotty Vazquez is a 59 y.o. female who presents for Hospital FU / Annual Lab Review (Pt in today for [facility] FU s/p arm numbness / annual lab review FU).    Review of Systems  Denies N/V/D/C, no HA/S/V, denies rashes/lesions, no CP/SOB. Denies fevers/chills.  All other systems were negative.    Objective   Physical Exam    Gen: NAD  ENT: hearing grossly intact, no sniffling noted  resp: no coughing noted, no SOB noted  neuro: Cranial nerves of speech and hearing (5, 7, 8, 9, 10, 12) grossly intact  psych: A&Ox3, recent and remote memory grossly intact, affect normal range, pleasant mood    Assessment/Plan   There are no diagnoses linked to this encounter.      Today doing annual preventative visit, annual lab review, hospital follow-up.          -----    Screening for cervical cancer.  Pap with HPV negative 2023.  Repeat around 2028.      Screening for breast cancer: Will order mammogram today.      Screening for colon cancer.  Patient feels she is due for colonoscopy. -->>  Refer to gastroenterology for evaluation and treatment.    Screening for hepatitis C was nonreactive.          Immunization counseling: Due for annual flu shot, latest coronavirus booster, new RSV shot, part 2 of the Shingrix series.  It appears also due for Tdap. -->>  Check with your pharmacy to keep up-to-date on immunizations.    -----        New annual labs reviewed:    -Dehydrated: -->> Drink more water.  A good goal would be 3 quarts a day.    -Hyperlipidemia: Cholesterol numbers are excellent on atorvastatin 20 mg daily along with co-Q10. -->>  Can refill as needed.    -Hypothyroidism, TSH 0.34, was 0.2, free T4 and free T3 within normal limits.  On 100 mcg daily levothyroxine.  Denies being too cold or too hot.  -->>  Continue 100 mcg levothyroxine.  Recheck with next annual labs.    -Vitamin D deficiency.  20, was 21, 30.  Goal is .-->>  Recommend increasing to 5000 units daily of vitamin D3.   We will recheck with next annual labs.        asthma.  Needing albuterol 1 or 2 days a week.  Worse in particular with higher humidity and temperatures.-->>  Will refill as needed.    Pinched nerve in neck.  Went to ER with weakness on 1 side.  They ruled out stroke.  Seeing orthopedics, -->> follow-up with Dr. Zuluaga as planned.    Coronary artery disease, post MI.  Sees Dr. Linn.  Follow-up as planned.    -Return in about a year for next annual preventative visit with annual lab review.    -Patient will go to [facility] about a week before next appointment to get fasting annual labs including thyroid panel drawn.

## 2024-08-27 NOTE — PATIENT INSTRUCTIONS
Today doing annual preventative visit, annual lab review, hospital follow-up.          -----    Screening for cervical cancer.  Pap with HPV negative 2023.  Repeat around 2028.      Screening for breast cancer: Will order mammogram today.      Screening for colon cancer.  Patient feels she is due for colonoscopy. -->>  Refer to gastroenterology for evaluation and treatment.    Screening for hepatitis C was nonreactive.          Immunization counseling: Due for annual flu shot, latest coronavirus booster, new RSV shot, part 2 of the Shingrix series.  It appears also due for Tdap. -->>  Check with your pharmacy to keep up-to-date on immunizations.    -----        New annual labs reviewed:    -Dehydrated: -->> Drink more water.  A good goal would be 3 quarts a day.    -Hyperlipidemia: Cholesterol numbers are excellent on atorvastatin 20 mg daily along with co-Q10. -->>  Can refill as needed.    -Hypothyroidism, TSH 0.34, was 0.2, free T4 and free T3 within normal limits.  On 100 mcg daily levothyroxine.  Denies being too cold or too hot.  -->>  Continue 100 mcg levothyroxine.  Recheck with next annual labs.    -Vitamin D deficiency.  20, was 21, 30.  Goal is .-->>  Recommend increasing to 5000 units daily of vitamin D3.  We will recheck with next annual labs.        asthma.  Needing albuterol 1 or 2 days a week.  Worse in particular with higher humidity and temperatures.-->>  Will refill as needed.    Pinched nerve in neck.  Went to ER with weakness on 1 side.  They ruled out stroke.  Seeing orthopedics, -->> follow-up with Dr. Zuluaga as planned.      -Return in about a year for next annual preventative visit with annual lab review.    -Patient will go to [facility] about a week before next appointment to get fasting annual labs including thyroid panel drawn.

## 2024-08-30 ENCOUNTER — PATIENT OUTREACH (OUTPATIENT)
Dept: PRIMARY CARE | Facility: CLINIC | Age: 59
End: 2024-08-30
Payer: COMMERCIAL

## 2024-08-30 NOTE — PROGRESS NOTES
Call regarding appt. with PCP on 8/27/24 after hospitalization.  At time of outreach call the patient feels as if their condition has (returned to baseline) since last visit. No new medications prescribed but pt was instructed to stop taking Buspar.  Reviewed the PCP appointment with the pt and addressed any questions or concerns.

## 2024-09-04 ENCOUNTER — TREATMENT (OUTPATIENT)
Dept: PHYSICAL THERAPY | Facility: CLINIC | Age: 59
End: 2024-09-04
Payer: COMMERCIAL

## 2024-09-04 DIAGNOSIS — M25.512 ACUTE PAIN OF LEFT SHOULDER: ICD-10-CM

## 2024-09-04 DIAGNOSIS — M54.2 NECK PAIN: ICD-10-CM

## 2024-09-04 DIAGNOSIS — M54.12 CERVICAL RADICULOPATHY: ICD-10-CM

## 2024-09-04 DIAGNOSIS — M77.8 TENDINITIS OF LEFT SHOULDER: ICD-10-CM

## 2024-09-04 PROCEDURE — 97110 THERAPEUTIC EXERCISES: CPT | Mod: GP,CQ

## 2024-09-04 ASSESSMENT — PAIN DESCRIPTION - DESCRIPTORS: DESCRIPTORS: TINGLING

## 2024-09-04 ASSESSMENT — PAIN - FUNCTIONAL ASSESSMENT: PAIN_FUNCTIONAL_ASSESSMENT: 0-10

## 2024-09-04 ASSESSMENT — PAIN SCALES - GENERAL: PAINLEVEL_OUTOF10: 2

## 2024-09-04 NOTE — PROGRESS NOTES
"Physical Therapy Treatment    Patient Name: Scotty Vazquez  MRN: [id number]  Today's Date: 9/4/2024  Time Calculation  Start Time: 0830  Stop Time: 0916  Time Calculation (min): 46 min    Insurance  Visit number: 2/8  VARSHA 90V PT OT COPAY 0 DED [phone])  COVERAGE 80 OOP 7900(984) 98008(983) NO AUTH REQ  REF# CAITIE W I- [id number]/ALL      Current Problem  1. Neck pain  Follow Up In Physical Therapy      2. Acute pain of left shoulder  Follow Up In Physical Therapy      3. Tendinitis of left shoulder  Follow Up In Physical Therapy      4. Cervical radiculopathy  Follow Up In Physical Therapy          Subjective    General   Pt reports she was \"a little sore, tired\" following initial visit. Reports increase in sx's remained for about a day.  She has been doing HEP given, 2x/day, but has not noticed any difference or improvement at this time.  Pt reports she is sore today.     Precautions  Precautions  Precautions Comment: No recent falls reported    Pain  Pain Assessment  Pain Assessment: 0-10  0-10 (Numeric) Pain Score: 2  Pain Location: Neck (L Shoulder)  Pain Orientation: Left  Pain Radiating Towards: None currently  Pain Descriptors: Tingling  Pain Frequency: Intermittent    Objective   Pt reports occasional numbness, tingling into L UE.  Reports sx's go from L side of neck to hand/fingers.  Reports sx's last night for about 1-2 minutes, but no sx's currently.    Posture:  Rounded Shoulders  Forward Head    Treatments:  Therapeutic Exercises (90080): 42 Minutes, 3 Units    Activities:  Chin Tuck (seated): 5\" x20  Scap Retractions: 5\" x20  UT Stretch: 30\" x3  DANITZA  Lev Scap Stretch: 30\" x3  DANITZA  Lower Cervical/Upper Thoracic Stretch: 15\" x5  Doorway Stretch: --> Add NV  Seated TB B ER: RTB, 2x10  Sidelying Ex's (4 way): x15 each  DANITZA  TB Row: RTB, 2x10    Assessment:   Reviewed activities initiated first visit, providing vc's as needed for postural corrections & overall technique.  She exhibits " good recall of the ex's, suggesting good compliance so far with HEP.  Good follow through to cues provided, when needed.  Continued to progress with activities this visit to increase ROM, strength, stability of B shoulders & cervical spine.  Lev.Scap stretch, lower cervical/upper thoracic stretch & sidelying ex's were added to the activities performed this visit.  She had some difficulty with activities secondary to overall weakness in B shoulders & mild c/o pain.  Also reports occasional N/T, which comes/goes quickly at times during activities. Otherwise, tolerated session well & overall reports normal muscle soreness, fatigue post activity.  HEP handouts updated this visit.  Anticipate she will be able to continue with POC & progressions as tolerated.    Plan:   Continue to progress with rehab POC as tolerated to reduce sx's, increase ROM, strength of  B shoulders & cervical spine so that the pt can perform ADLs, work activities without pain or increase in sx's.  Add activities as noted NV.     OP EDUCATION:   Access Code: [email or web address]  URL: [email or web address]  Date: 09/04/2024  Prepared by: Niecy Saez    Exercises  - Gentle Levator Scapulae Stretch  - 2 x daily - 7 x weekly - 3 sets - 30 hold  - Standing Lower Cervical and Upper Thoracic Stretch  - 2 x daily - 7 x weekly - 5 sets - 15 hold  - Sidelying Shoulder External Rotation  - 1 x daily - 7 x weekly - 3 sets - 10 reps  - Sidelying Shoulder Abduction Palm Forward  - 1 x daily - 7 x weekly - 3 sets - 10 reps  - Sidelying Shoulder Flexion 15 Degrees  - 1 x daily - 7 x weekly - 3 sets - 10 reps  - Sidelying Shoulder Horizontal Abduction  - 1 x daily - 7 x weekly - 3 sets - 10 reps

## 2024-09-07 ENCOUNTER — APPOINTMENT (OUTPATIENT)
Dept: RADIOLOGY | Facility: HOSPITAL | Age: 59
End: 2024-09-07
Payer: COMMERCIAL

## 2024-09-11 ENCOUNTER — PATIENT OUTREACH (OUTPATIENT)
Dept: PRIMARY CARE | Facility: CLINIC | Age: 59
End: 2024-09-11
Payer: COMMERCIAL

## 2024-09-12 ENCOUNTER — TREATMENT (OUTPATIENT)
Dept: PHYSICAL THERAPY | Facility: CLINIC | Age: 59
End: 2024-09-12
Payer: COMMERCIAL

## 2024-09-12 DIAGNOSIS — M54.2 NECK PAIN: Primary | ICD-10-CM

## 2024-09-12 DIAGNOSIS — M77.8 TENDINITIS OF LEFT SHOULDER: ICD-10-CM

## 2024-09-12 DIAGNOSIS — M25.512 ACUTE PAIN OF LEFT SHOULDER: ICD-10-CM

## 2024-09-12 DIAGNOSIS — M54.12 CERVICAL RADICULOPATHY: ICD-10-CM

## 2024-09-12 PROCEDURE — 97110 THERAPEUTIC EXERCISES: CPT | Mod: GP,CQ

## 2024-09-12 ASSESSMENT — PAIN - FUNCTIONAL ASSESSMENT: PAIN_FUNCTIONAL_ASSESSMENT: 0-10

## 2024-09-12 ASSESSMENT — PAIN SCALES - GENERAL: PAINLEVEL_OUTOF10: 2

## 2024-09-12 ASSESSMENT — PAIN DESCRIPTION - DESCRIPTORS: DESCRIPTORS: TINGLING

## 2024-09-12 NOTE — PROGRESS NOTES
"Physical Therapy Treatment    Patient Name: Scotty Vazquez  MRN: [id number]  Today's Date: 9/12/2024  Time Calculation  Start Time: 1048  Stop Time: 1126  Time Calculation (min): 38 min     PT Therapeutic Procedures Time Entry  Therapeutic Exercise Time Entry: 38                 Insurance:   Visit number: 3/8  VARSHA 90V PT OT COPAY 0 DED [phone])  COVERAGE 80 OOP [phone]) [phone]) NO AUTH REQ  REF# CAITIE W I- [id number]   Assessment:   Progressed her program today w/ addition of doorway pec stretch (arms down). Emphasis w/ all stretches on GENTLE pull/holds and cervical spine in neutral position as to not cause added stress to area. Pain initially w/ scap squeezes, likely dt increased ut elevation. Tactile cues to decrease UT elevation and to increased lower scap setting w/ much improved kylie to ex (less pain). Cues needed for postural awareness w/ tband ex's w/ improved ft. Pt unable to complete SA punches attempted today dt post R shoulder pain. She will cont to benefit from skilled PT to address her deficits and improve her overall functional ability.   Plan:   Continue to progress with rehab POC as tolerated to reduce sx's, increase ROM, strength of  B shoulders & cervical spine so that the pt can perform ADLs, work activities without pain or increase in sx's. MD fu tomorrow (Dr. PAMELA Johnson) - discuss outcome of MD bush at next PT sched Mon 9/16.   Current Problem  1. Neck pain  Follow Up In Physical Therapy      2. Acute pain of left shoulder  Follow Up In Physical Therapy      3. Tendinitis of left shoulder  Follow Up In Physical Therapy      4. Cervical radiculopathy  Follow Up In Physical Therapy          Subjective   General   \"Soreness\" in both neck and shoulder after last session. \"I went home and took 2 tylenols.\"   Pt states the tingling in her L arm and hand is \"better\".   Precautions       Pain  Pain Assessment: 0-10  0-10 (Numeric) Pain Score: 2  Pain Location: Neck  Pain " "Orientation: Left  Pain Descriptors: Tingling  Pain Frequency: Intermittent    Objective       Treatments:  Therapeutic Exercise (63008):  Chin Tuck (seated): 5\" x10  Scap Retractions: 5\" x10  UT Stretch: 30\" x 2  DANITZA  Lev Scap Stretch: 30\" x3  DANITZA  Lower Cervical/Upper Thoracic Stretch: 15\" x3  Doorway Stretch: 15\" x 3   Seated TB B ER: YTB, x10 (supine today)  TB B HABD YTB x10 (supine today)   Sidelying Ex's (4 way): x15 each  (L)   TB Row: RTB, x10   TB Tricep Pull downs: Initiate NV  SA punch - attempted P!    EDUCATION:     "

## 2024-09-13 ENCOUNTER — APPOINTMENT (OUTPATIENT)
Dept: ORTHOPEDIC SURGERY | Facility: CLINIC | Age: 59
End: 2024-09-13
Payer: COMMERCIAL

## 2024-09-13 VITALS — WEIGHT: 170 LBS | HEIGHT: 55 IN | BODY MASS INDEX: 39.34 KG/M2

## 2024-09-13 DIAGNOSIS — M75.82 ROTATOR CUFF TENDINITIS, LEFT: ICD-10-CM

## 2024-09-13 DIAGNOSIS — M54.12 CERVICAL RADICULOPATHY: Primary | ICD-10-CM

## 2024-09-13 DIAGNOSIS — M77.8 TENDINITIS OF LEFT SHOULDER: ICD-10-CM

## 2024-09-13 DIAGNOSIS — M75.42 ROTATOR CUFF IMPINGEMENT SYNDROME OF LEFT SHOULDER: ICD-10-CM

## 2024-09-13 NOTE — LETTER
September 13, 2024     Patient: Scotty Vazquez   YOB: 1965   Date of Visit: 9/13/2024       To Whom It May Concern:    It is my medical opinion that Scotty Vazquez may return to light duty immediately with the following restrictions: No pushing, pulling , lifting and Bending anything over 25 pounds. [facility]'s paperwork to submit to the employer and insurance which will be available in the next 10 days.     If you have any questions or concerns, please don't hesitate to call.     Sincerely,        Montez Johnson,     CC: No Recipients

## 2024-09-13 NOTE — PROGRESS NOTES
Established follow-up patient Visit    HPI: Scotty is a 59 y.o.female who presents today for follow-up of her left cervical radiculopathy and rotator cuff tendinitis.  States that she is approximately 50% better.  She does still have some numbness and tingling into her hands.  She has been doing physical therapy, has had 4 sessions, and feels like it is helping her quite a bit.  She is unable to do NSAIDs as she is on blood thinners.  She states that the steroid and Flexeril helped to some degree, but feels like physical therapy and home exercises are helping the most.  She would like to continue with these.  Unfortunately, she has been unable to return to work as there is no light duty for her, so she is taking a short-term disability.    On 8/15/2024, she presented with new complaints of left shoulder injury.  She has been having pain anteriorly and numbness that radiates down to the fingers.  It affects all of her fingers.  She went to the emergency department for this pain, they thought she may be having a stroke.  She denies any falls or injuries.  This has been going on for about 3 weeks.  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  She has been off since Monday.  She denies any popping, clicking, and locking.    Plan: Today, on 9/13/2024, we will have her continue with her physical therapy, but will also obtain an MRI of her cervical spine due to her continued radicular symptoms, and have her follow-up with the spine team with results of the MRI.  For the shoulder, I will follow-up with her in approximately 3 to 4 weeks after some more therapy.  Will be our goal to have her return to work at that time.  We will fill out her paperwork for her short-term disability.  We provided her a note for restrictions at work.    On 8/15/2024, for this left cervical radiculopathy and left rotator cuff tendinitis, we will start her on physical therapy, Medrol  Dosepak, cyclobenzaprine, and other conservative treatment measures as discussed.  Will follow-up in 4 to 5 weeks.  We gave her a work note.    Assessment:   Problem List Items Addressed This Visit       Tendinitis of left shoulder    Cervical radiculopathy - Primary    Relevant Orders    MR cervical spine wo IV contrast     Other Visit Diagnoses       Rotator cuff tendinitis, left        Rotator cuff impingement syndrome of left shoulder                  Diagnostics: Reviewed      Procedure:  Procedures    Physical Exam:  GENERAL:  No obvious acute distress.  NEURO:  Distally neurovascularly intact.  Sensation intact to light touch.  Extremity: Left shoulder exam shows:  Skin is intact;  No erythema or warmth;  No edema or ecchymosis;  No clinical signs of infection;  Can forward flex to 180 degrees, but still with pain at terminal range of motion, but improved;  Abduction to 180 degrees, but still with pain at terminal range of motion, but improved;  External rotation from neutral at 75 degrees;  Internal rotation at the level of T10;  POSITIVE signs of impingement with Francisco or Neer's test;  Negative empty can test;  Negative crossarm test;  No pain over the AC joint;  Negative [place]'s test;  Negative sulcus sign;  Negative anterior apprehension's test; and  Neurovascularly intact.    Exam of the cervical spine:  No tenderness along the midline of the cervical spine;  No step-offs along the midline of the cervical spine;  Flexion is full limited with pain;  Extension is limited with pain;  Sidebending is full and symmetric, but with pain;  Rotation is full and symmetric, but with pain;  POSITIVE Spurling's on the right;  Negative Spurling's on the left;  Full strength and range of motion throughout the bilateral upper extremities; and  Nonantalgic gait.    Orders Placed This Encounter    MR cervical spine wo IV contrast      At the conclusion of the visit there were no further questions by the patient/family  regarding their plan of care.  Patient was instructed to call or return with any issues, questions, or concerns regarding their injury and/or treatment plan described above.     09/13/24 at 10:46 AM - Montez Johnson, DO    Office: [phone]    This note was prepared using voice recognition software.  The details of this note are correct and have been reviewed, and corrected to the best of my ability.  Some grammatical errors may persist related to the Dragon software.

## 2024-09-16 ENCOUNTER — TREATMENT (OUTPATIENT)
Dept: PHYSICAL THERAPY | Facility: CLINIC | Age: 59
End: 2024-09-16
Payer: COMMERCIAL

## 2024-09-16 DIAGNOSIS — M54.12 CERVICAL RADICULOPATHY: ICD-10-CM

## 2024-09-16 DIAGNOSIS — M77.8 TENDINITIS OF LEFT SHOULDER: ICD-10-CM

## 2024-09-16 DIAGNOSIS — M54.2 NECK PAIN: Primary | ICD-10-CM

## 2024-09-16 DIAGNOSIS — M25.512 ACUTE PAIN OF LEFT SHOULDER: ICD-10-CM

## 2024-09-16 PROCEDURE — 97110 THERAPEUTIC EXERCISES: CPT | Mod: GP,CQ

## 2024-09-16 ASSESSMENT — PAIN - FUNCTIONAL ASSESSMENT: PAIN_FUNCTIONAL_ASSESSMENT: 0-10

## 2024-09-16 ASSESSMENT — PAIN SCALES - GENERAL: PAINLEVEL_OUTOF10: 2

## 2024-09-16 ASSESSMENT — PAIN DESCRIPTION - DESCRIPTORS: DESCRIPTORS: TINGLING

## 2024-09-16 NOTE — PROGRESS NOTES
Physical Therapy Treatment    Patient Name: Scotty Vazquez  MRN: [id number]  Today's Date: 9/16/2024  Time Calculation  Start Time: 1045  Stop Time: 1125  Time Calculation (min): 40 min     PT Therapeutic Procedures Time Entry  Therapeutic Exercise Time Entry: 40            Plan: (From MD follow up w/ Dr. Johnson) Today, on 9/13/2024, we will have her continue with her physical therapy, but will also obtain an MRI of her cervical spine due to her continued radicular symptoms, and have her follow-up with the spine team with results of the MRI.  For the shoulder, I will follow-up with her in approximately 3 to 4 weeks after some more therapy.  Will be our goal to have her return to work at that time.  We will fill out her paperwork for her short-term disability.  We provided her a note for restrictions at work.      Insurance:   Visit number: 4/8  ANTHEM 90V PT OT COPAY 0 DED [phone])  COVERAGE 80 OOP [phone]) [phone]) NO AUTH REQ  REF# CAITIE W I- [id number]   Assessment:   Progressed pt's program w/ increased reps and addition of tband tricep pulldown and UBE (Retro only). Some pain c/o w/ tricep ex however she could complete reps. Cues for postural awareness and decrease UT elevation w/ most other ex's. Pt fatigued w/ addition of UBE however was able to focus on posture w/ ex w/ only some discomfort c/o.  Pt w/ some pain c/o w/ SL ex's which did decrease w/ cues to increase scap retraction. Cues needed for correct completion of seated UT and Levator stretches. Pt making nice progress at this time and will cont to benefit from skilled PT to address her deficits and improve her overall functional ability.  Plan:   Continue to progress with rehab POC as tolerated to reduce sx's, increase ROM, strength of  B shoulders & cervical spine so that the pt can perform ADLs, work activities without pain or increase in sx's.   Current Problem  1. Neck pain  Follow Up In Physical Therapy      2. Acute pain  "of left shoulder  Follow Up In Physical Therapy      3. Tendinitis of left shoulder  Follow Up In Physical Therapy      4. Cervical radiculopathy  Follow Up In Physical Therapy          Subjective   General   Pt reports \"not too much\" soreness in the shoulder/neck after last session. \"The mornings always seem to be when it's the most sore and then it feels better as the day goes on.\" Pt saw MD last week for fu and will be getting MRI of neck for cont'd radicular symptoms.   Precautions       Pain  Pain Assessment: 0-10  0-10 (Numeric) Pain Score: 2  Pain Location: Shoulder  Pain Orientation: Left  Pain Descriptors: Tingling  Pain Frequency: Intermittent    Objective       Treatments:  Therapeutic Exercise (08541):  UBE (Retro) 3 min  Chin Tuck (seated): 5\" x10  Scap Retractions: 5\" x10  UT Stretch: 30\" x 2  DANITZA  Lev Scap Stretch: 30\" x 2  DANITZA  Lower Cervical/Upper Thoracic Stretch: 15\" x3  Doorway Stretch: 15\" x 3   Seated TB B ER: YTB, 2x10 (supine today)  TB B HABD YTB 2x10 (supine today)   Sidelying Ex's (4 way): x15 each  (L)   TB Row, Ext: RTB, x15   TB Tricep Pull downs: RTB x10   SA punch - attempted P! --    EDUCATION:         "

## 2024-09-18 ENCOUNTER — TREATMENT (OUTPATIENT)
Dept: PHYSICAL THERAPY | Facility: CLINIC | Age: 59
End: 2024-09-18
Payer: COMMERCIAL

## 2024-09-18 DIAGNOSIS — M54.2 NECK PAIN: Primary | ICD-10-CM

## 2024-09-18 DIAGNOSIS — M25.512 ACUTE PAIN OF LEFT SHOULDER: ICD-10-CM

## 2024-09-18 DIAGNOSIS — M77.8 TENDINITIS OF LEFT SHOULDER: ICD-10-CM

## 2024-09-18 DIAGNOSIS — M54.12 CERVICAL RADICULOPATHY: ICD-10-CM

## 2024-09-18 PROCEDURE — 97110 THERAPEUTIC EXERCISES: CPT | Mod: GP,CQ

## 2024-09-18 ASSESSMENT — PAIN SCALES - GENERAL: PAINLEVEL_OUTOF10: 0 - NO PAIN

## 2024-09-18 ASSESSMENT — PAIN - FUNCTIONAL ASSESSMENT: PAIN_FUNCTIONAL_ASSESSMENT: 0-10

## 2024-09-18 NOTE — PROGRESS NOTES
"Physical Therapy Treatment    Patient Name: Scotty Vazquez  MRN: [id number]  Today's Date: 9/18/2024  Time Calculation  Start Time: 1047  Stop Time: 1130  Time Calculation (min): 43 min     PT Therapeutic Procedures Time Entry  Therapeutic Exercise Time Entry: 43                 Insurance:   Visit number: 5/8  ANTHBRIAN 90V PT OT COPAY 0 DED [phone])  COVERAGE 80 OOP [phone]) [phone](983) NO AUTH REQ  REF# CAITIE W I- [id number]/ALL   Assessment:   Cont to have pt perform supine tband bilat ER and HABD vs sitting to decrease UT compensation/elevation. This will help her to improve her postural awareness, scapular strength and improve radicular symptoms. Increased resistance today to Red (vs yellow) for this ex. Resumed SA punches today in supine w/ overall good ability. Pt had significant pain w/ ex when attempted a few visits ago. Good ft w/ cues for pacing and holds. Pt cont's to make nice progress towards her goals and will benefit from further skilled PT to address her deficits.    Plan:   Continue to progress with rehab POC as tolerated to reduce sx's, increase ROM, strength of  B shoulders & cervical spine so that the pt can perform ADLs, work activities without pain or increase in sx's.     Current Problem  1. Neck pain  Follow Up In Physical Therapy      2. Acute pain of left shoulder  Follow Up In Physical Therapy      3. Tendinitis of left shoulder  Follow Up In Physical Therapy      4. Cervical radiculopathy  Follow Up In Physical Therapy        Subjective   General   Pt states her shoulder has \"no pain\" today and feels pretty good. Pt states the neck on the other hand is \"still about a 2/10.\" Tingling is better today. \"I'm not getting anything this morning.\"   Precautions     Pain  Pain Assessment: 0-10  0-10 (Numeric) Pain Score: 0 - No pain    Objective     Treatments:  Therapeutic Exercise (36140):  UBE (Retro) 4 min  Chin Tuck (seated): 5\" x10  Scap Retractions: 5\" x10  UT Stretch: 30\" x " "1 DANITZA  Lev Scap Stretch: 30\" x 1 DANITZA  Lower Cervical/Upper Thoracic Stretch: 15\" x3 --  Doorway Stretch: 15\" x 3   Seated TB B ER: RTB, 2x10 (supine today)  TB B HABD RTB 2x10 (supine today)   Sidelying Ex's (4 way): x15 each  (L)   TB Row, Ext: RTB, 2x10    TB Tricep Pull downs: RTB 2x10   SA punch - x10    EDUCATION:         "

## 2024-09-23 NOTE — PROGRESS NOTES
"Physical Therapy Treatment    Patient Name: Scotty Vazquez  MRN: [id number]  Today's Date: 9/24/2024  Time Calculation  Start Time: 1049  Stop Time: 1129  Time Calculation (min): 40 min     PT Therapeutic Procedures Time Entry  Manual Therapy Time Entry: 10  Therapeutic Exercise Time Entry: 28                 Current Problem  1. Neck pain  Follow Up In Physical Therapy      2. Acute pain of left shoulder  Follow Up In Physical Therapy      3. Tendinitis of left shoulder  Follow Up In Physical Therapy      4. Cervical radiculopathy  Follow Up In Physical Therapy          Insurance:  Visit number: 6/8  ANTHEM 90V PT OT COPAY 0 DED [phone])  COVERAGE 80 OOP [phone]) [phone](983) NO AUTH REQ  REF# CAITIE W I- [id number]/ALL   Precautions   none    Subjective   Subjective:   Pt reports that her neck has been more sore today. It has been bothering her more since Saturday, when she was walking her dog and he pulled her. The pain is on her L side. Pt says she awoke today with numbness and tingling down her arm and in all her fingers  Pain   3-4/10    Objective   Treatments:  Therapeutic Exercise (45831):  UBE (Retro) 4 min  Chin Tuck (seated): 5\" x10  Chin tucks supine 5\"x15  Scap Retractions: 5\" x10  UT Stretch: 30\" x 1 DANITZA*  TB Row, Ext: RTB, 2x10    Unloaded chin trucks 10x3    Not today:  Lev Scap Stretch: 30\" x 1 DANITZA  Lower Cervical/Upper Thoracic Stretch: 15\" x3 --  Doorway Stretch: 15\" x 3   Seated TB B ER: RTB, 2x10 (supine today)  TB B HABD RTB 2x10 (supine today)   Sidelying Ex's (4 way): x15 each  (L)   TB Tricep Pull downs: RTB 2x10   SA punch - x10    Seated STM to L UT 10'  OP EDUCATION:   Good posture      Assessment:   Pt had increased pain when doing UT stretch away from the L side, but less pain when leaning towards the L. Pt had more flexion that retraction with seated chin tucks, thus trialed in supine. Pt still had a little increased tingling in the fingers with this. Pt felt some relief " from manual traction with unloaded chin tucks.  Pt also felt less tingling after Stm to the L UT.  Cues given with Tb rows to not allow the shoulders to round fwd.  Plan:   Maybe try mechanical traction next visit, based on how she feels tonight

## 2024-09-23 NOTE — PROGRESS NOTES
History of Present Illness:  Scotty Vazquez is a 59 y.o. female presenting to clinic with complaints of left shoulder pain.  She has seen Dr. Zuluaga who started her out with conservative treatment including Medrol Dosepak, cyclobenzaprine and physical therapy which she states has improved her pain.  However over the last couple of days it seems that the pain around her neck has worsened.  She continues to have radiating numbness and tingling down the left upper extremity along with pain in the shoulder with reaching lifting and carrying.    Imaging:  X-rays left shoulder: Show no acute fracture or dislocation.  No arthritic changes.     Assessment:   Left rotator cuff tendinitis    Plan:  We reviewed the role of imaging, physical therapy, injections and the time frame to healing and correlation with outcome.  Medrol Dosepak  NSAID: She may consider celebrex but encourage further discussion with PCP.  GI side effects and medical risks discussed  Ice: 60 minutes on and off  Exercise home program: Medically directed Shoulder therapy / Handout given  Formal physical therapy  Follow-up as needed.      Physical Exam:  Well-nourished, well-developed. No acute distress. Alert and oriented x3. Responds appropriately to questioning. Good mood. Normal affect.  Physical Exam  Left Shoulder:  Strength / ROM: 5/5 rotator cuff strength   Pain with impingement  Normal external rotation without stiffness  Speeds test + impingement  Positive Neer and Hawkin´s test  Neurovascular exam normal distally     Review of Systems:  GENERAL: Negative for malaise, significant weight loss, fever  MUSCULOSKELETAL: See HPI  NEURO:  Negative     Past Medical History:   Diagnosis Date    Acute frontal sinusitis, unspecified     Acute frontal sinusitis    Acute laryngitis     Acute laryngitis    Other conditions influencing health status     History of cough    Other specified abnormal findings of blood chemistry     Elevated TSH    Personal  history of nicotine dependence     History of nicotine dependence    Personal history of other diseases of the circulatory system     History of carotid artery stenosis    Personal history of other diseases of the digestive system     History of constipation    Personal history of other diseases of the digestive system     History of hiatal hernia    Personal history of other diseases of the musculoskeletal system and connective tissue     History of neck pain    Personal history of other diseases of the respiratory system     History of chronic obstructive lung disease    Personal history of other specified conditions     History of diarrhea    Personal history of other specified conditions     History of nasal congestion    Personal history of other specified conditions     History of vomiting    Personal history of pneumonia (recurrent)     History of pneumonia    Right lower quadrant pain     Abdominal pain, RLQ (right lower quadrant)    Right upper quadrant pain     Abdominal pain, right upper quadrant    Unilateral primary osteoarthritis, right hip 09/14/2021    Osteoarthritis of right hip       Medication Documentation Review Audit       Reviewed by Lauren Huerta MA (Medical Assistant) on 09/13/24 at 1022      Medication Order Taking? Sig Documenting Provider Last Dose Status   acetaminophen (Tylenol) 500 mg tablet [id number] No Take 1 tablet (500 mg) by mouth every 6 hours if needed. Historical Provider, MD Unknown Active   albuterol (ProAir HFA) 90 mcg/actuation inhaler [id number] No Inhale 1-2 puffs every 4 hours if needed for wheezing or shortness of breath. Nicolas Porter, DO Unknown Active   aspirin 81 mg chewable tablet [id number] No Chew 1 tablet (81 mg) once daily. Historical Provider, MD Past Week Active   atorvastatin (Lipitor) 20 mg tablet [id number] No TAKE ONE-HALF TABLET AT BEDTIME Ezequiel Linn DO Past Week Active   cetirizine (ZyrTEC) 10 mg tablet [id number] No Take 1 tablet (10 mg) by mouth  once daily. Historical Provider, MD Past Week Active   coenzyme Q-10 100 mg capsule [id number] No Take 1 capsule (100 mg) by mouth once daily. Historical Provider, MD Past Week Active   diclofenac sodium (Voltaren) 1 % gel [id number] No Apply 4.5 inches (4 g) topically 4 times a day as needed (shoulder pain). Kevin Rodriges MD Taking Active   icosapent ethyL (Vascepa) 1 gram capsule [id number] No TAKE 2 CAPSULES BY MOUTH TWICE A DAY Ezequiel Linn, DO 2024 Active   isosorbide mononitrate ER (Imdur) 30 mg 24 hr tablet [id number] No Take 1 tablet (30 mg) by mouth once daily. Do not crush or chew. Historical Provider, MD Past Week Active   levothyroxine (Synthroid, Levoxyl) 100 mcg tablet [id number] No PLEASE SEE ATTACHED FOR DETAILED DIRECTIONS Nicolas Porter,  2024 Active   methylPREDNISolone (Medrol Dospak) 4 mg tablets [id number]  Follow schedule on package instructions Montez Johnson,   Active   montelukast (Singulair) 10 mg tablet [id number] No TAKE 1 TABLET DAILY Yuliet Hatfield MD Past Week Active   nitroglycerin (Nitrostat) 0.4 mg SL tablet [id number] No Place 1 tablet (0.4 mg) under the tongue every 5 minutes if needed for chest pain. Ezequiel Linn, DO Unknown Active   polyethylene glycol (Glycolax) 17 gram/dose powder [id number] No Take 17 g by mouth once daily. Historical Provider, MD Past Week Active                    Allergies   Allergen Reactions    Codeine Unknown    Penicillins Unknown       Social History     Socioeconomic History    Marital status:      Spouse name: Not on file    Number of children: Not on file    Years of education: Not on file    Highest education level: Not on file   Occupational History    Not on file   Tobacco Use    Smoking status: Former     Current packs/day: 0.00     Types: Cigarettes     Quit date:      Years since quittin.7    Smokeless tobacco: Never   Substance and Sexual Activity    Alcohol use: Not Currently    Drug use: Never     Sexual activity: Not on file   Other Topics Concern    Not on file   Social History Narrative    Not on file     Social Determinants of Health     Financial Resource Strain: Patient Declined (2024)    Overall Financial Resource Strain (CARDIA)     Difficulty of Paying Living Expenses: Patient declined   Food Insecurity: Not on file   Transportation Needs: Patient Declined (2024)    PRAPARE - Transportation     Lack of Transportation (Medical): Patient declined     Lack of Transportation (Non-Medical): Patient declined   Physical Activity: Not on file   Stress: Not on file   Social Connections: Not on file   Intimate Partner Violence: Not on file   Housing Stability: Patient Declined (2024)    Housing Stability Vital Sign     Unable to Pay for Housing in the Last Year: Patient declined     Number of Times Moved in the Last Year: 1     Homeless in the Last Year: Patient declined       Past Surgical History:   Procedure Laterality Date    OTHER SURGICAL HISTORY  2022    Cardiac catheterization with stent placement    OTHER SURGICAL HISTORY  2022     section    OTHER SURGICAL HISTORY  2022    Hip replacement       No results found.                           Scribe Attestation  By signing my name below, [employer] Scribe   attest that this documentation has been prepared under the direction and in the presence of Carrillo Ball MD.

## 2024-09-24 ENCOUNTER — OFFICE VISIT (OUTPATIENT)
Dept: ORTHOPEDIC SURGERY | Facility: CLINIC | Age: 59
End: 2024-09-24
Payer: COMMERCIAL

## 2024-09-24 ENCOUNTER — TREATMENT (OUTPATIENT)
Dept: PHYSICAL THERAPY | Facility: CLINIC | Age: 59
End: 2024-09-24
Payer: COMMERCIAL

## 2024-09-24 DIAGNOSIS — M77.8 TENDINITIS OF LEFT SHOULDER: ICD-10-CM

## 2024-09-24 DIAGNOSIS — M54.12 CERVICAL RADICULOPATHY: ICD-10-CM

## 2024-09-24 DIAGNOSIS — M54.2 NECK PAIN: Primary | ICD-10-CM

## 2024-09-24 DIAGNOSIS — M25.512 ACUTE PAIN OF LEFT SHOULDER: ICD-10-CM

## 2024-09-24 DIAGNOSIS — M75.42 ROTATOR CUFF IMPINGEMENT SYNDROME OF LEFT SHOULDER: ICD-10-CM

## 2024-09-24 PROCEDURE — 99214 OFFICE O/P EST MOD 30 MIN: CPT | Performed by: ORTHOPAEDIC SURGERY

## 2024-09-24 PROCEDURE — 97140 MANUAL THERAPY 1/> REGIONS: CPT | Mod: GP,CQ

## 2024-09-24 PROCEDURE — 97110 THERAPEUTIC EXERCISES: CPT | Mod: GP,CQ

## 2024-09-24 RX ORDER — METHYLPREDNISOLONE 4 MG/1
TABLET ORAL
Qty: 21 TABLET | Refills: 0 | Status: SHIPPED | OUTPATIENT
Start: 2024-09-24

## 2024-09-25 ENCOUNTER — HOSPITAL ENCOUNTER (OUTPATIENT)
Dept: RADIOLOGY | Facility: HOSPITAL | Age: 59
Discharge: HOME | End: 2024-09-25
Payer: COMMERCIAL

## 2024-09-25 DIAGNOSIS — M54.12 CERVICAL RADICULOPATHY: ICD-10-CM

## 2024-09-25 PROCEDURE — 72141 MRI NECK SPINE W/O DYE: CPT | Performed by: RADIOLOGY

## 2024-09-25 PROCEDURE — 72141 MRI NECK SPINE W/O DYE: CPT

## 2024-09-26 ENCOUNTER — TREATMENT (OUTPATIENT)
Dept: PHYSICAL THERAPY | Facility: CLINIC | Age: 59
End: 2024-09-26
Payer: COMMERCIAL

## 2024-09-26 DIAGNOSIS — M77.8 TENDINITIS OF LEFT SHOULDER: ICD-10-CM

## 2024-09-26 DIAGNOSIS — M54.12 CERVICAL RADICULOPATHY: ICD-10-CM

## 2024-09-26 DIAGNOSIS — M25.512 ACUTE PAIN OF LEFT SHOULDER: ICD-10-CM

## 2024-09-26 DIAGNOSIS — M54.2 NECK PAIN: Primary | ICD-10-CM

## 2024-09-26 PROCEDURE — 97110 THERAPEUTIC EXERCISES: CPT | Mod: GP,CQ

## 2024-09-26 PROCEDURE — 97140 MANUAL THERAPY 1/> REGIONS: CPT | Mod: GP,CQ

## 2024-09-26 ASSESSMENT — PAIN - FUNCTIONAL ASSESSMENT: PAIN_FUNCTIONAL_ASSESSMENT: 0-10

## 2024-09-26 ASSESSMENT — PAIN SCALES - GENERAL: PAINLEVEL_OUTOF10: 0 - NO PAIN

## 2024-09-26 NOTE — PROGRESS NOTES
"Physical Therapy Treatment    Patient Name: Scotty Vazquez  MRN: [id number]  Today's Date: 9/26/2024  Time Calculation  Start Time: 1045  Stop Time: 1128  Time Calculation (min): 43 min     PT Therapeutic Procedures Time Entry  Manual Therapy Time Entry: 20  Therapeutic Exercise Time Entry: 23                 Insurance:   Visit number: 7/8  VARSHA 90V PT OT COPAY 0 DED [phone])  COVERAGE 80 OOP [phone]) NO AUTH REQ  REF# CAITIE W I- [id number]   Assessment:   Pt cont's to get relief in symptoms w/ manual intervention - especially STM/TPR to L UT, Levator and Rhomboids. Pt discussed possibly getting massage more regularly as she feels this has helped w/ her L shoulder and arm pain tremendously. Much emphasis cont's to be on postural awareness and decreasing UT elevation. Pt making good progress and is having much less L radicular symptoms. Answered her questions today prior to end of session.     Plan:  Pt to see Supervising PT next visit, Mon 9/30.     Current Problem  1. Neck pain  Follow Up In Physical Therapy      2. Acute pain of left shoulder  Follow Up In Physical Therapy      3. Tendinitis of left shoulder  Follow Up In Physical Therapy      4. Cervical radiculopathy  Follow Up In Physical Therapy          Subjective   General   Pt states her neck/shoulder \"feels amazing\" and she has no pain coming in today. \"Whatever you guys are doing it definitely helping.\"  Precautions       Pain  Pain Assessment: (P) 0-10  0-10 (Numeric) Pain Score: (P) 0 - No pain    Objective       Treatments:  Therapeutic Exercise (73782):  UBE (Retro) 4 min  Chin Tuck (seated): 5\" x10  Chin tucks supine 5\"x15  Scap Retractions: 5\" x10  UT Stretch: 30\" x 1 DANITZA*  TB Row, Ext: RTB, 2x10    Unloaded chin trucks 10x3 --     Not today:  Lev Scap Stretch: 30\" x 1 DANITZA  Lower Cervical/Upper Thoracic Stretch: 15\" x3 --  Doorway Stretch: 15\" x 3   Seated TB B ER: RTB, 2x10 (supine today)  TB B HABD RTB 2x10 " (supine today)   Sidelying Ex's (4 way): x15 each  (L)   TB Tricep Pull downs: RTB 2x10   SA punch - x10       Manual (75818):   Seated STM to L UT 15'   Supine Manual Cervical traction - 5 min     EDUCATION:

## 2024-09-30 ENCOUNTER — TREATMENT (OUTPATIENT)
Dept: PHYSICAL THERAPY | Facility: CLINIC | Age: 59
End: 2024-09-30
Payer: COMMERCIAL

## 2024-09-30 DIAGNOSIS — M25.512 ACUTE PAIN OF LEFT SHOULDER: ICD-10-CM

## 2024-09-30 DIAGNOSIS — M54.12 CERVICAL RADICULOPATHY: ICD-10-CM

## 2024-09-30 DIAGNOSIS — M54.2 NECK PAIN: ICD-10-CM

## 2024-09-30 DIAGNOSIS — M77.8 TENDINITIS OF LEFT SHOULDER: ICD-10-CM

## 2024-09-30 PROCEDURE — 97110 THERAPEUTIC EXERCISES: CPT | Mod: GP | Performed by: PHYSICAL THERAPIST

## 2024-09-30 NOTE — PROGRESS NOTES
Physical Therapy Treatment    Patient Name: Scotty Vazquez  MRN: [id number]  Today's Date: 9/30/2024  Time Calculation  Start Time: 1050  Stop Time: 1118  Time Calculation (min): 28 min       PT Therapeutic Procedures Time Entry  Therapeutic Exercise Time Entry: 23                   INSURANCE:  Visit number: 8/8  ANTHBRIAN 90V PT OT COPAY 0 DED [phone])  COVERAGE 80 OOP [phone]) NO AUTH REQ  REF# CAITIE W I- [id number]     Referring Provider: Montez Johnson DO  Hx: Heart Disease, Asthma, Hypothyroidism     CURRENT PROBLEM:  1. Neck pain  Follow Up In Physical Therapy      2. Acute pain of left shoulder  Follow Up In Physical Therapy      3. Tendinitis of left shoulder  Follow Up In Physical Therapy      4. Cervical radiculopathy  Follow Up In Physical Therapy        SUBJECTIVE:   General -   Pt is doing home exercises.  Pt states that she is feeling great and would like this to be her last visit.    Pain -    Pain Location/Description: Cervical spine and L UE/Shoulder   Pain Best: 0/10  Pain Today: 0/10  Pain Worst: 0-3/10    Precautions -    None    OBJECTIVE:   Cervical AROM (degrees) -  Cervical Flexion: 34  Cervical Extension: 30  R Cervical Side Bend: 26  L Cervical Side Bend: 23  R Cervical Rotation: 42  L Cervical Rotation: 40     Shoulder AROM (degrees) - WFL B     Shoulder MMT (/5) -   R shoulder Flexion: 4+  R shoulder Abduction: 4+   R shoulder ER: 4   R shoulder IR: 4+   L shoulder Flexion: 4+  L shoulder Abduction: 4+   L shoulder ER: 4   L shoulder IR: 4+     Outcome Measures -   Other Measures  Disability of Arm Shoulder Hand (DASH): 0 (% Quick)    Treatment -   Therapeutic Exercise (48143) -  Assessment  On wall T-band B ER: RTB 2x10  On wall T-band B Hz AB: RTB 2x10  T-band B Rows/Ext: GTB 2x10 ea  T-band resisted chin tucks: RTB 2x10    OP Patient Education -   HEP2Go:  Chin tucks with t-band resistance    Pt given updated T-bands for home Green and  Blue    ASSESSMENT:     Pt has progressed well towards her goals and demonstrates improvements with both ROM and strength of cervical spine and B shoulders at this time. Pt reports an increased ability to perform ADLs, as shown by her Quick DASH scores. Pt was given and reviewed an updated HEP. Pt will therefore be discharged from skilled PT at this time and can call with any further questions/concerns.    PLAN:  PT Plan: No Additional PT interventions required at this time  Rehab Potential: Good  Plan of Care Agreement: Patient  Discharge pt from current episode of care.     Goals -    By discharge pt will achieve the following goals:   Pt will report less than a 2/10 pain at the worst. (Goal PROGRESSING)    Pt will report a significant improvement with Quick DASH score. (Goal MET)   Pt will have at least 4+/5 strength for the bilateral shoulders. (Goal MET)   Pt will have AROM to WFL for the cervical spine. (Goal MET)   Pt will be independent with HEP. (Goal MET)

## 2024-10-02 ENCOUNTER — TELEPHONE (OUTPATIENT)
Dept: GASTROENTEROLOGY | Facility: CLINIC | Age: 59
End: 2024-10-02
Payer: COMMERCIAL

## 2024-10-02 NOTE — TELEPHONE ENCOUNTER
Left a voice mail that we need to reschedule her 10/14/24 appt. With Reena Enrique CNP.  Asked patient to call back to reschedule.

## 2024-10-11 ENCOUNTER — APPOINTMENT (OUTPATIENT)
Dept: ORTHOPEDIC SURGERY | Facility: CLINIC | Age: 59
End: 2024-10-11
Payer: COMMERCIAL

## 2024-10-11 DIAGNOSIS — M77.8 TENDINITIS OF LEFT SHOULDER: Primary | ICD-10-CM

## 2024-10-11 NOTE — PROGRESS NOTES
Established follow-up patient Visit    HPI: Scotty is a 59 y.o.female who presents today for follow-up of her left cervical radiculopathy and rotator cuff tendinitis.  She states that she is 100% better.  Physical therapy helped a lot.  She is doing her home exercises.  She had a Medrol Dosepak prescribed by Dr. Carrillo Ball on 9/24/2024, and the seem to help quite a bit.  She would like to go back to work without restrictions.  She did have her MRI of her cervical spine and is following up with spine team.    On 9/13/2024, she presented for follow-up of her left cervical radiculopathy and rotator cuff tendinitis.  States that she is approximately 50% better.  She does still have some numbness and tingling into her hands.  She has been doing physical therapy, has had 4 sessions, and feels like it is helping her quite a bit.  She is unable to do NSAIDs as she is on blood thinners.  She states that the steroid and Flexeril helped to some degree, but feels like physical therapy and home exercises are helping the most.  She would like to continue with these.  Unfortunately, she has been unable to return to work as there is no light duty for her, so she is taking a short-term disability.    On 8/15/2024, she presented with new complaints of left shoulder injury.  She has been having pain anteriorly and numbness that radiates down to the fingers.  It affects all of her fingers.  She went to the emergency department for this pain, they thought she may be having a stroke.  She denies any falls or injuries.  This has been going on for about 3 weeks.  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  She has been off since Monday.  She denies any popping, clicking, and locking.    Plan: Today, on 10/11/2024, she can return to working activities as tolerated, continue her home exercises, and follow-up as needed.    On 9/13/2024, we will have her continue with her physical  therapy, but will also obtain an MRI of her cervical spine due to her continued radicular symptoms, and have her follow-up with the spine team with results of the MRI.  For the shoulder, I will follow-up with her in approximately 3 to 4 weeks after some more therapy.  Will be our goal to have her return to work at that time.  We will fill out her paperwork for her short-term disability.  We provided her a note for restrictions at work.    On 8/15/2024, for this left cervical radiculopathy and left rotator cuff tendinitis, we will start her on physical therapy, Medrol Dosepak, cyclobenzaprine, and other conservative treatment measures as discussed.  Will follow-up in 4 to 5 weeks.  We gave her a work note.    Assessment:   Problem List Items Addressed This Visit       Tendinitis of left shoulder - Primary           Diagnostics: Reviewed      Procedure:  Procedures    Physical Exam:  GENERAL:  No obvious acute distress.  NEURO:  Distally neurovascularly intact.  Sensation intact to light touch.  Extremity: Left shoulder exam shows:  Skin is intact;  No erythema or warmth;  No edema or ecchymosis;  No clinical signs of infection;  Can forward flex to 180 degrees,  Abduction to 180 degrees,   External rotation from neutral at 75 degrees;  Internal rotation at the level of T10;  No signs of impingement with Francisco or Neer's test;  Negative empty can test;  Negative crossarm test;  No pain over the AC joint;  Negative Eugene's test;  Negative sulcus sign;  Negative anterior apprehension's test; and  Neurovascularly intact.    Exam of the cervical spine:  No tenderness along the midline of the cervical spine;  No step-offs along the midline of the cervical spine;  Flexion is full limited with pain;  Extension is limited with pain;  Sidebending is full and symmetric, but with pain;  Rotation is full and symmetric, but with pain;  POSITIVE Spurling's on the right;  Negative Spurling's on the left;  Full strength and range  of motion throughout the bilateral upper extremities; and  Nonantalgic gait.    No orders of the defined types were placed in this encounter.     At the conclusion of the visit there were no further questions by the patient/family regarding their plan of care.  Patient was instructed to call or return with any issues, questions, or concerns regarding their injury and/or treatment plan described above.     10/11/24 at 10:36 AM - Montez Johnson,     Office: [phone]    This note was prepared using voice recognition software.  The details of this note are correct and have been reviewed, and corrected to the best of my ability.  Some grammatical errors may persist related to the Dragon software.

## 2024-10-11 NOTE — LETTER
October 11, 2024     Patient: Scotty Vazquez   YOB: 1965   Date of Visit: 10/11/2024       To Whom It May Concern:    It is my medical opinion that Scotty Vazquez  may return to work  10/14/2024 with no restrictions. Paperwork will be faxed with physician signatures within the next week.     If you have any questions or concerns, please don't hesitate to call.         Sincerely,        Montez Johnson,     CC: No Recipients

## 2024-10-14 ENCOUNTER — APPOINTMENT (OUTPATIENT)
Dept: GASTROENTEROLOGY | Facility: CLINIC | Age: 59
End: 2024-10-14
Payer: COMMERCIAL

## 2024-10-15 ENCOUNTER — APPOINTMENT (OUTPATIENT)
Dept: ORTHOPEDIC SURGERY | Facility: CLINIC | Age: 59
End: 2024-10-15
Payer: COMMERCIAL

## 2024-10-27 DIAGNOSIS — E03.9 HYPOTHYROIDISM, UNSPECIFIED TYPE: ICD-10-CM

## 2024-10-28 RX ORDER — LEVOTHYROXINE SODIUM 100 UG/1
TABLET ORAL
Qty: 100 TABLET | Refills: 3 | Status: SHIPPED | OUTPATIENT
Start: 2024-10-28

## 2024-11-08 ENCOUNTER — PATIENT OUTREACH (OUTPATIENT)
Dept: PRIMARY CARE | Facility: CLINIC | Age: 59
End: 2024-11-08
Payer: COMMERCIAL

## 2024-11-21 ENCOUNTER — TELEMEDICINE (OUTPATIENT)
Dept: PRIMARY CARE | Facility: CLINIC | Age: 59
End: 2024-11-21
Payer: COMMERCIAL

## 2024-11-21 DIAGNOSIS — J01.10 ACUTE FRONTAL SINUSITIS, RECURRENCE NOT SPECIFIED: Primary | ICD-10-CM

## 2024-11-21 DIAGNOSIS — J02.9 PHARYNGITIS, UNSPECIFIED ETIOLOGY: ICD-10-CM

## 2024-11-21 DIAGNOSIS — M75.42 ROTATOR CUFF IMPINGEMENT SYNDROME OF LEFT SHOULDER: ICD-10-CM

## 2024-11-21 PROCEDURE — 99214 OFFICE O/P EST MOD 30 MIN: CPT | Performed by: FAMILY MEDICINE

## 2024-11-21 RX ORDER — METHYLPREDNISOLONE 4 MG/1
TABLET ORAL
Qty: 21 TABLET | Refills: 0 | Status: SHIPPED | OUTPATIENT
Start: 2024-11-21

## 2024-11-21 RX ORDER — DOXYCYCLINE 100 MG/1
CAPSULE ORAL
Qty: 20 CAPSULE | Refills: 0 | Status: SHIPPED | OUTPATIENT
Start: 2024-11-21

## 2024-11-21 NOTE — PROGRESS NOTES
Subjective   Patient ID: Scotty Vazquez is a 59 y.o. female who presents for Virtual Visit (Pt being seen virtually for c/o sinus congestions / sore throat).    Review of Systems  Denies N/V/D/C, no HA/S/V, denies rashes/lesions, no CP/SOB. Denies fevers/chills.  Positive for sinus congestion, frontal headache, sore throat.  All other systems were negative.    Objective   Physical Exam    Gen: NAD  eyes: EOMI,   ENT: hearing grossly intact, no nasal discharge  resp: breathing comfortably, no SOB noted  derm: no rashes or lesions noted  neuro: CN II-XII grossly intact  psych: A&Ox3, mood pleasant, affect appropriate, recent and remote memory grossly intact.      Assessment/Plan   Diagnoses and all orders for this visit:  Acute frontal sinusitis, recurrence not specified  -     methylPREDNISolone (Medrol Dospak) 4 mg tablets; Use as directed by package instructions  -     doxycycline (Monodox) 100 mg capsule; 1 by mouth twice daily with food for 10 days.  Rotator cuff impingement syndrome of left shoulder  Pharyngitis, unspecified etiology  -     methylPREDNISolone (Medrol Dospak) 4 mg tablets; Use as directed by package instructions  -     doxycycline (Monodox) 100 mg capsule; 1 by mouth twice daily with food for 10 days.        Acute frontal sinusitis, pharyngitis on top of asthma.  Has had symptoms about a week.  Getting frontal sinus pressure, discomfort.  Significantly sore throat.  Taking some over-the-counter remedies but not helping very much. -->> Increase your Nasacort dosing to 2 sprays each nostril twice daily.  Will prescribe Augmentin plus a Medrol Dosepak.  Continue Mucinex but make sure you are drinking plenty of water.       asthma.  A little worse with the respiratory infection.  Generally normally just needing albuterol 1 or 2 days a week.  Worse in particular with higher humidity and temperatures.-->>  Will refill as needed.    H/o Coronary artery disease, post MI.  Sees Dr. Linn.  This is a  risk factor for worse outcomes with upper respiratory infections.        Follow-up the end of next summer as already scheduled.

## 2024-11-21 NOTE — PATIENT INSTRUCTIONS
Acute frontal sinusitis, pharyngitis on top of asthma.  Has had symptoms about a week.  Getting frontal sinus pressure, discomfort.  Significantly sore throat.  Taking some over-the-counter remedies but not helping very much. -->> Increase your Nasacort dosing to 2 sprays each nostril twice daily.  Will prescribe Augmentin plus a Medrol Dosepak.  Continue Mucinex but make sure you are drinking plenty of water.         asthma.  A little worse with the respiratory infection.  Generally normally just needing albuterol 1 or 2 days a week.  Worse in particular with higher humidity and temperatures.-->>  Will refill as needed.    H/o Coronary artery disease, post MI.  Sees Dr. Linn.  This is a risk factor for worse outcomes with upper respiratory infections.        Follow-up the end of next summer as already scheduled.

## 2024-12-11 ENCOUNTER — APPOINTMENT (OUTPATIENT)
Dept: CARDIOLOGY | Facility: CLINIC | Age: 59
End: 2024-12-11
Payer: COMMERCIAL

## 2024-12-12 ENCOUNTER — OFFICE VISIT (OUTPATIENT)
Dept: CARDIOLOGY | Facility: CLINIC | Age: 59
End: 2024-12-12
Payer: COMMERCIAL

## 2024-12-12 VITALS
DIASTOLIC BLOOD PRESSURE: 72 MMHG | BODY MASS INDEX: 35.34 KG/M2 | HEIGHT: 59 IN | SYSTOLIC BLOOD PRESSURE: 130 MMHG | HEART RATE: 70 BPM | WEIGHT: 175.3 LBS

## 2024-12-12 DIAGNOSIS — E78.5 HYPERLIPIDEMIA, UNSPECIFIED: ICD-10-CM

## 2024-12-12 DIAGNOSIS — I25.10 CAD S/P PERCUTANEOUS CORONARY ANGIOPLASTY: ICD-10-CM

## 2024-12-12 DIAGNOSIS — E78.5 DYSLIPIDEMIA: ICD-10-CM

## 2024-12-12 DIAGNOSIS — E78.1 PURE HYPERGLYCERIDEMIA: ICD-10-CM

## 2024-12-12 DIAGNOSIS — R00.1 SINUS BRADYCARDIA: ICD-10-CM

## 2024-12-12 DIAGNOSIS — I25.2 H/O NON-ST ELEVATION MYOCARDIAL INFARCTION (NSTEMI): ICD-10-CM

## 2024-12-12 DIAGNOSIS — R07.9 CHEST PAIN, UNSPECIFIED TYPE: ICD-10-CM

## 2024-12-12 DIAGNOSIS — Z98.61 CAD S/P PERCUTANEOUS CORONARY ANGIOPLASTY: ICD-10-CM

## 2024-12-12 DIAGNOSIS — Z87.891 FORMER SMOKER: ICD-10-CM

## 2024-12-12 PROCEDURE — 1036F TOBACCO NON-USER: CPT | Performed by: INTERNAL MEDICINE

## 2024-12-12 PROCEDURE — 3008F BODY MASS INDEX DOCD: CPT | Performed by: INTERNAL MEDICINE

## 2024-12-12 PROCEDURE — 99214 OFFICE O/P EST MOD 30 MIN: CPT | Performed by: INTERNAL MEDICINE

## 2024-12-12 RX ORDER — ISOSORBIDE MONONITRATE 30 MG/1
30 TABLET, EXTENDED RELEASE ORAL DAILY
Qty: 90 TABLET | Refills: 3 | Status: SHIPPED | OUTPATIENT
Start: 2024-12-12 | End: 2025-12-12

## 2024-12-12 RX ORDER — ATORVASTATIN CALCIUM 20 MG/1
20 TABLET, FILM COATED ORAL DAILY
Qty: 90 TABLET | Refills: 3 | Status: SHIPPED | OUTPATIENT
Start: 2024-12-12

## 2024-12-12 RX ORDER — NITROGLYCERIN 0.4 MG/1
0.4 TABLET SUBLINGUAL EVERY 5 MIN PRN
Qty: 25 TABLET | Refills: 5 | Status: SHIPPED | OUTPATIENT
Start: 2024-12-12

## 2024-12-12 RX ORDER — ICOSAPENT ETHYL 1 G/1
2 CAPSULE ORAL 2 TIMES DAILY
Qty: 360 CAPSULE | Refills: 3 | Status: SHIPPED | OUTPATIENT
Start: 2024-12-12

## 2024-12-12 NOTE — PATIENT INSTRUCTIONS
MPX exercise stress test  soon  Avoid salt, instructions given  Continue same medications and treatments.   Patient educated on proper medication use.   Patient educated on risk factor modification.   Please bring any lab results from other providers / physicians to your next appointment.     Please bring all medicines, vitamins, and herbal supplements with you when you come to the office.     Prescriptions will not be filled unless you are compliant with your follow up appointments or have a follow up appointment scheduled as per instruction of your physician. Refills should be requested at the time of your visit.  Follow up after

## 2024-12-12 NOTE — PROGRESS NOTES
CARDIOLOGY OFFICE VISIT      CHIEF COMPLAINT  Chief Complaint   Patient presents with    Follow-up     Patient is Present for 1 year follow up.           HISTORY OF PRESENT ILLNESS  Patient is 59-year-old  female with past medical history significant for coronary artery disease status post non-ST elevation myocardial infarction status post drug-eluting stents right coronary artery and ramus branch 2019, dyslipidemia, asthma, hypothyroid, obesity who presents for follow cardiovascular care.      Patient has occasional palpitations lasting 1 minute in duration described as mild.  Patient has occasional ankle edema.  Patient eats out 3 times per week.  Patient denies chest pain, dyspnea, nausea, vomiting, dizziness, near-syncope, gautam syncope,  claudication.  In September patient had left-sided weakness related to a pinched nerve in his been undergoing physical therapy.  Patient was out of work for 2.5 months and is following with Dr. Zuluaga.  No formal exercise program.        Past surgical history:    Right total hip replacement      Social history:  Quit smoking 2.5 years ago, smoked one pack per day for 32 years  Occasional alcohol use     Family history:  Mom  62 COPD  Dad  73 congestive heart failure, coronary artery disease first myocardial infarction at age 63, sister history of thyroid disease and asthma     Review systems are negative, noncontributory, orders previous mentioned Ã--12 systems     I personally reviewed EKG 2019: Sinus bradycardia 52 bpm     Assessment:     Coronary artery disease/NSTEMI/ RUBY right coronary artery and ramus 2019  Dyslipidemia  Asymptomatic sinus bradycardia  History of asystole at time of sheath removal post cardiac catheter presumed vagal  Asthma  Hypothyroid  Anxiety  Obesity  Fatigue  Left cervical radiculopathy     Recommendations:  It has been over 3 years since patient's last ischemic evaluation.  At this  time obtain exercise nuclear stress test.  Follow-up after testing.     Please excuse any errors in grammar or translation related to this dictation. Voice recognition software was utilized to prepare this document.    Recent Cardiovascular Testing:  The following results have been reviewed and updated.    Labs: 8/12/24  1.T C 126, HDL 55, LDL 39, Trig 159        Stress Kourtney 9/8/21  1. Normal  2. EF 75%  3. 8.5 METS     Echo: 1/7/19  1.EF 60-65%.      Overnight pulse oximetry: 7/16/19  1. Low SpO2 90.0%.     KOH:P 7/22/19  1. NSR 55-63 bpm.  2. Palpitations associated with NSR.  3. No dysrhythmia.             VITALS  Vitals:    12/12/24 0919   BP: 130/72   Pulse: 70     Wt Readings from Last 4 Encounters:   12/12/24 79.5 kg (175 lb 4.8 oz)   09/13/24 77.1 kg (170 lb)   08/27/24 76.2 kg (168 lb)   08/15/24 77.1 kg (170 lb)       PHYSICAL EXAM:  GENERAL:  Well developed, well nourished, in no acute distress.  CHEST:  Symmetric and nontender.  NEURO/PSYCH:  Alert and oriented times three with approppriate behavior and responses.  NECK:  Supple, no JVD, no bruit.  LUNGS:  Clear to auscultation bilaterally, normal respiratory effort.  HEART:  Rate and rhythm REGULAR with no evident murmur, no gallop appreciated.    There are no rubs, clicks or heaves.  EXTREMITIES:  Warm with good color, no clubbing or cyanosis.  There is bilateral trace lower extremity  edema noted.  PERIPHERAL VASCULAR:  Pulses present and equally palpable; 2+ throughout.    ASSESSMENT AND PLAN  Problem List Items Addressed This Visit          Cardiac and Vasculature    Dyslipidemia    Sinus bradycardia    Relevant Medications    isosorbide mononitrate ER (Imdur) 30 mg 24 hr tablet    nitroglycerin (Nitrostat) 0.4 mg SL tablet    CAD S/P percutaneous coronary angioplasty    Relevant Medications    isosorbide mononitrate ER (Imdur) 30 mg 24 hr tablet    nitroglycerin (Nitrostat) 0.4 mg SL tablet    Other Relevant Orders    Nuclear Stress Test  (Completed)    Chest pain    Relevant Medications    nitroglycerin (Nitrostat) 0.4 mg SL tablet    H/O non-ST elevation myocardial infarction (NSTEMI)    Relevant Orders    Nuclear Stress Test (Completed)       Endocrine/Metabolic    BMI 35.0-35.9,adult       Tobacco    Former smoker     Other Visit Diagnoses       Hyperlipidemia, unspecified        Relevant Medications    atorvastatin (Lipitor) 20 mg tablet    Pure hyperglyceridemia        Relevant Medications    icosapent ethyL (Vascepa) 1 gram capsule            Past Medical History  Past Medical History:   Diagnosis Date    Acute frontal sinusitis, unspecified     Acute frontal sinusitis    Acute laryngitis     Acute laryngitis    Arthritis     Asthma     Disease of thyroid gland     Myocardial infarction (Multi)     Other conditions influencing health status     History of cough    Other specified abnormal findings of blood chemistry     Elevated TSH    Personal history of nicotine dependence     History of nicotine dependence    Personal history of other diseases of the circulatory system     History of carotid artery stenosis    Personal history of other diseases of the digestive system     History of constipation    Personal history of other diseases of the digestive system     History of hiatal hernia    Personal history of other diseases of the musculoskeletal system and connective tissue     History of neck pain    Personal history of other diseases of the respiratory system     History of chronic obstructive lung disease    Personal history of other specified conditions     History of diarrhea    Personal history of other specified conditions     History of nasal congestion    Personal history of other specified conditions     History of vomiting    Personal history of pneumonia (recurrent)     History of pneumonia    Right lower quadrant pain     Abdominal pain, RLQ (right lower quadrant)    Right upper quadrant pain     Abdominal pain, right upper  quadrant    Unilateral primary osteoarthritis, right hip 2021    Osteoarthritis of right hip       Social History  Social History     Tobacco Use    Smoking status: Former     Current packs/day: 0.00     Types: Cigarettes     Quit date:      Years since quittin.0    Smokeless tobacco: Never   Substance Use Topics    Alcohol use: Not Currently    Drug use: Never       Family History     Family History   Problem Relation Name Age of Onset    COPD Mother Pat     Heart failure Father Montez     Coronary artery disease Father Montez     Hypertension Father Montez     Heart attack Father Montez     Asthma Sister Elza     Thyroid disease Sister Elza         Allergies:  Allergies   Allergen Reactions    Codeine Unknown    Penicillins Unknown        Outpatient Medications:  Current Outpatient Medications   Medication Instructions    acetaminophen (Tylenol) 500 mg tablet 1 tablet, Every 6 hours PRN    albuterol 90 mcg/actuation inhaler 1-2 puffs, inhalation, Every 4 hours PRN    aspirin 81 mg chewable tablet 1 tablet, Daily RT    atorvastatin (LIPITOR) 20 mg, oral, Daily    cetirizine (ZYRTEC) 10 mg, Daily    coenzyme Q-10 100 mg capsule 1 capsule, Daily    diclofenac sodium (VOLTAREN) 4 g, Topical, 4 times daily PRN    doxycycline (Monodox) 100 mg capsule 1 by mouth twice daily with food for 10 days.    icosapent ethyL (VASCEPA) 2 g, oral, 2 times daily    isosorbide mononitrate ER (IMDUR) 30 mg, oral, Daily, Do not crush or chew.    levothyroxine (Synthroid, Levoxyl) 100 mcg tablet TAKE 1 TAB EVERY MORNING WITH HALF A GLASS OF WATER ON AN EMPTY STOMACHE BEFORE ANY FOOD    methylPREDNISolone (Medrol Dospak) 4 mg tablets Use as directed by package instructions    montelukast (Singulair) 10 mg tablet TAKE 1 TABLET DAILY    nitroglycerin (NITROSTAT) 0.4 mg, sublingual, Every 5 min PRN    polyethylene glycol (GLYCOLAX, MIRALAX) 17 g, Daily        Recent Lab Results:    CMP:    Lab Results   Component Value  Date     08/12/2024    K 4.3 08/12/2024     08/12/2024    CO2 28 08/12/2024    BUN 13 08/12/2024    CREATININE 0.65 08/12/2024    GLUCOSE 106 (H) 08/12/2024    CALCIUM 9.1 08/12/2024       Magnesium:    Lab Results   Component Value Date    MG 1.89 08/12/2024       Lipid Profile:    Lab Results   Component Value Date    TRIG 159 (H) 08/12/2024    HDL 55.0 08/12/2024    LDLCALC 39 08/12/2024       TSH:    Lab Results   Component Value Date    TSH 0.34 (L) 08/12/2024       BNP:   Lab Results   Component Value Date    BNP 48 08/12/2024        PT/INR:    Lab Results   Component Value Date    PROTIME 12.3 08/12/2024    INR 1.1 08/12/2024       HgBA1c:    Lab Results   Component Value Date    HGBA1C 5.4 08/12/2024       BMP:  Lab Results   Component Value Date     08/12/2024     08/01/2023     09/28/2022     06/15/2022    K 4.3 08/12/2024    K 4.3 08/01/2023    K 4.1 09/28/2022    K 4.2 06/15/2022     08/12/2024     08/01/2023     09/28/2022     06/15/2022    CO2 28 08/12/2024    CO2 28 08/01/2023    CO2 28 09/28/2022    CO2 27 06/15/2022    BUN 13 08/12/2024    BUN 16 08/01/2023    BUN 11 09/28/2022    BUN 13 06/15/2022    CREATININE 0.65 08/12/2024    CREATININE 0.63 08/01/2023    CREATININE 0.66 09/28/2022    CREATININE 0.64 06/15/2022       CBC:  Lab Results   Component Value Date    WBC 7.0 08/12/2024    WBC 6.5 08/01/2023    WBC 9.2 11/05/2021    WBC 5.7 10/28/2021    RBC 4.63 08/12/2024    RBC 4.59 08/01/2023    RBC 3.77 (L) 11/05/2021    RBC 4.49 10/28/2021    HGB 14.0 08/12/2024    HGB 14.0 08/01/2023    HGB 11.3 (L) 11/05/2021    HGB 13.4 10/28/2021    HCT 42.0 08/12/2024    HCT 41.7 08/01/2023    HCT 34.8 (L) 11/05/2021    HCT 41.8 10/28/2021    MCV 91 08/12/2024    MCV 91 08/01/2023    MCV 92 11/05/2021    MCV 93 10/28/2021    MCH 30.2 08/12/2024    MCHC 33.3 08/12/2024    MCHC 33.6 08/01/2023    MCHC 32.5 11/05/2021    MCHC 32.1 10/28/2021    RDW  12.8 08/12/2024    RDW 13.4 08/01/2023    RDW 13.3 11/05/2021    RDW 12.8 10/28/2021     08/12/2024     08/01/2023     11/05/2021     10/28/2021       Cardiac Enzymes:    Lab Results   Component Value Date    TROPHS 10 08/12/2024    TROPHS 8 08/12/2024       Hepatic Function Panel:    Lab Results   Component Value Date    ALKPHOS 78 08/12/2024    ALT 29 08/12/2024    AST 17 08/12/2024    PROT 6.4 08/12/2024    BILITOT 0.9 08/12/2024           Ezequiel Linn DO

## 2024-12-16 DIAGNOSIS — J45.909 MILD ASTHMA, UNSPECIFIED WHETHER COMPLICATED, UNSPECIFIED WHETHER PERSISTENT (HHS-HCC): ICD-10-CM

## 2024-12-17 RX ORDER — ALBUTEROL SULFATE 90 UG/1
1-2 INHALANT RESPIRATORY (INHALATION) EVERY 4 HOURS PRN
Qty: 18 G | Refills: 11 | Status: SHIPPED | OUTPATIENT
Start: 2024-12-17

## 2024-12-31 ENCOUNTER — HOSPITAL ENCOUNTER (OUTPATIENT)
Dept: RADIOLOGY | Facility: CLINIC | Age: 59
Discharge: HOME | End: 2024-12-31
Payer: COMMERCIAL

## 2024-12-31 ENCOUNTER — HOSPITAL ENCOUNTER (OUTPATIENT)
Dept: CARDIOLOGY | Facility: CLINIC | Age: 59
Discharge: HOME | End: 2024-12-31
Payer: COMMERCIAL

## 2024-12-31 DIAGNOSIS — Z98.61 CAD S/P PERCUTANEOUS CORONARY ANGIOPLASTY: ICD-10-CM

## 2024-12-31 DIAGNOSIS — I25.2 H/O NON-ST ELEVATION MYOCARDIAL INFARCTION (NSTEMI): ICD-10-CM

## 2024-12-31 DIAGNOSIS — I25.10 CAD S/P PERCUTANEOUS CORONARY ANGIOPLASTY: ICD-10-CM

## 2024-12-31 PROCEDURE — A9502 TC99M TETROFOSMIN: HCPCS | Performed by: INTERNAL MEDICINE

## 2024-12-31 PROCEDURE — 3430000001 HC RX 343 DIAGNOSTIC RADIOPHARMACEUTICALS: Performed by: INTERNAL MEDICINE

## 2024-12-31 PROCEDURE — 78452 HT MUSCLE IMAGE SPECT MULT: CPT

## 2024-12-31 PROCEDURE — 93017 CV STRESS TEST TRACING ONLY: CPT

## 2024-12-31 PROCEDURE — 93016 CV STRESS TEST SUPVJ ONLY: CPT | Performed by: INTERNAL MEDICINE

## 2024-12-31 PROCEDURE — 78452 HT MUSCLE IMAGE SPECT MULT: CPT | Performed by: INTERNAL MEDICINE

## 2024-12-31 PROCEDURE — 93018 CV STRESS TEST I&R ONLY: CPT | Performed by: INTERNAL MEDICINE

## 2024-12-31 RX ADMIN — TETROFOSMIN 34.7 MILLICURIE: 0.23 INJECTION, POWDER, LYOPHILIZED, FOR SOLUTION INTRAVENOUS at 09:34

## 2024-12-31 RX ADMIN — TETROFOSMIN 11 MILLICURIE: 0.23 INJECTION, POWDER, LYOPHILIZED, FOR SOLUTION INTRAVENOUS at 07:48

## 2025-01-08 ENCOUNTER — APPOINTMENT (OUTPATIENT)
Dept: CARDIOLOGY | Facility: CLINIC | Age: 60
End: 2025-01-08
Payer: COMMERCIAL

## 2025-01-25 DIAGNOSIS — E78.5 HYPERLIPIDEMIA, UNSPECIFIED: ICD-10-CM

## 2025-01-27 NOTE — TELEPHONE ENCOUNTER
Received request for prescription refills for patient.   Patient follows with Dr. Linn    Request is for lipitor  Is patient currently on medication yes, NEED TO CLARIFY WITH PT IF SHE TAKES A WHOLE TAB OR HALF A TAB OF LIPITOR    Last OV 12/12/2024  Next OV none scheduled, needs follow up to review recent stress test

## 2025-01-28 NOTE — TELEPHONE ENCOUNTER
Refill sent because pending OV, also reviewed last visit with Dr. Linn and pt was advised at that time to take 1 whole tab daily rather than 0.5 tab daily,  sent to pharmacy accordingly

## 2025-01-29 RX ORDER — ATORVASTATIN CALCIUM 20 MG/1
20 TABLET, FILM COATED ORAL NIGHTLY
Qty: 90 TABLET | Refills: 3 | Status: SHIPPED | OUTPATIENT
Start: 2025-01-29 | End: 2026-01-29

## 2025-02-19 ENCOUNTER — APPOINTMENT (OUTPATIENT)
Dept: CARDIOLOGY | Facility: CLINIC | Age: 60
End: 2025-02-19
Payer: COMMERCIAL

## 2025-03-12 LAB — T4 FREE SERPL-MCNC: 1.2 NG/DL (ref 0.8–1.8)

## 2025-08-25 ENCOUNTER — APPOINTMENT (OUTPATIENT)
Dept: PRIMARY CARE | Facility: CLINIC | Age: 60
End: 2025-08-25
Payer: COMMERCIAL

## 2025-08-25 DIAGNOSIS — J01.10 ACUTE FRONTAL SINUSITIS, RECURRENCE NOT SPECIFIED: ICD-10-CM

## 2025-08-25 DIAGNOSIS — J45.909 MILD ASTHMA, UNSPECIFIED WHETHER COMPLICATED, UNSPECIFIED WHETHER PERSISTENT (HHS-HCC): ICD-10-CM

## 2025-08-25 DIAGNOSIS — J45.20 MILD INTERMITTENT ASTHMA, UNSPECIFIED WHETHER COMPLICATED (HHS-HCC): ICD-10-CM

## 2025-08-25 DIAGNOSIS — J02.9 PHARYNGITIS, UNSPECIFIED ETIOLOGY: ICD-10-CM

## 2025-08-25 PROCEDURE — 99214 OFFICE O/P EST MOD 30 MIN: CPT | Performed by: FAMILY MEDICINE

## 2025-08-25 RX ORDER — METHYLPREDNISOLONE 4 MG/1
TABLET ORAL
Qty: 21 TABLET | Refills: 0 | Status: SHIPPED | OUTPATIENT
Start: 2025-08-25

## 2025-08-25 RX ORDER — DOXYCYCLINE 100 MG/1
CAPSULE ORAL
Qty: 20 CAPSULE | Refills: 0 | Status: SHIPPED | OUTPATIENT
Start: 2025-08-25

## 2025-08-25 ASSESSMENT — ENCOUNTER SYMPTOMS
SORE THROAT: 1
HOARSE VOICE: 1
SHORTNESS OF BREATH: 1

## 2025-08-25 ASSESSMENT — PATIENT HEALTH QUESTIONNAIRE - PHQ9
1. LITTLE INTEREST OR PLEASURE IN DOING THINGS: NOT AT ALL
2. FEELING DOWN, DEPRESSED OR HOPELESS: NOT AT ALL
SUM OF ALL RESPONSES TO PHQ9 QUESTIONS 1 AND 2: 0

## 2025-08-26 ENCOUNTER — APPOINTMENT (OUTPATIENT)
Dept: PRIMARY CARE | Facility: CLINIC | Age: 60
End: 2025-08-26
Payer: COMMERCIAL

## 2025-08-29 DIAGNOSIS — J02.9 PHARYNGITIS, UNSPECIFIED ETIOLOGY: ICD-10-CM

## 2025-08-29 DIAGNOSIS — J01.10 ACUTE FRONTAL SINUSITIS, RECURRENCE NOT SPECIFIED: ICD-10-CM

## 2025-08-29 RX ORDER — METHYLPREDNISOLONE 4 MG/1
TABLET ORAL
Qty: 21 EACH | OUTPATIENT
Start: 2025-08-29

## 2025-09-16 ENCOUNTER — APPOINTMENT (OUTPATIENT)
Dept: PRIMARY CARE | Facility: CLINIC | Age: 60
End: 2025-09-16
Payer: COMMERCIAL